# Patient Record
Sex: MALE | Race: WHITE | NOT HISPANIC OR LATINO | URBAN - METROPOLITAN AREA
[De-identification: names, ages, dates, MRNs, and addresses within clinical notes are randomized per-mention and may not be internally consistent; named-entity substitution may affect disease eponyms.]

---

## 2017-07-25 ENCOUNTER — INPATIENT (INPATIENT)
Facility: HOSPITAL | Age: 57
LOS: 2 days | Discharge: HOME | DRG: 440 | End: 2017-07-28
Attending: FAMILY MEDICINE | Admitting: HOSPITALIST
Payer: COMMERCIAL

## 2017-07-25 VITALS
HEART RATE: 79 BPM | OXYGEN SATURATION: 99 % | TEMPERATURE: 98 F | RESPIRATION RATE: 16 BRPM | HEIGHT: 70 IN | SYSTOLIC BLOOD PRESSURE: 150 MMHG | DIASTOLIC BLOOD PRESSURE: 82 MMHG | WEIGHT: 182.98 LBS

## 2017-07-25 DIAGNOSIS — K90.81 WHIPPLE'S DISEASE: Chronic | ICD-10-CM

## 2017-07-25 LAB
ALBUMIN SERPL ELPH-MCNC: 3.9 G/DL — SIGNIFICANT CHANGE UP (ref 3.3–5)
ALP SERPL-CCNC: 91 U/L — SIGNIFICANT CHANGE UP (ref 40–120)
ALT FLD-CCNC: 54 U/L — SIGNIFICANT CHANGE UP (ref 12–78)
AMYLASE P1 CFR SERPL: 168 U/L — HIGH (ref 25–115)
ANION GAP SERPL CALC-SCNC: 11 MMOL/L — SIGNIFICANT CHANGE UP (ref 5–17)
APTT BLD: 27 SEC — LOW (ref 27.5–37.4)
AST SERPL-CCNC: 50 U/L — HIGH (ref 15–37)
BASOPHILS # BLD AUTO: 0.1 K/UL — SIGNIFICANT CHANGE UP (ref 0–0.2)
BASOPHILS NFR BLD AUTO: 0.6 % — SIGNIFICANT CHANGE UP (ref 0–2)
BILIRUB SERPL-MCNC: 0.7 MG/DL — SIGNIFICANT CHANGE UP (ref 0.2–1.2)
BUN SERPL-MCNC: 11 MG/DL — SIGNIFICANT CHANGE UP (ref 7–23)
CALCIUM SERPL-MCNC: 8.8 MG/DL — SIGNIFICANT CHANGE UP (ref 8.5–10.1)
CHLORIDE SERPL-SCNC: 99 MMOL/L — SIGNIFICANT CHANGE UP (ref 96–108)
CO2 SERPL-SCNC: 27 MMOL/L — SIGNIFICANT CHANGE UP (ref 22–31)
CREAT SERPL-MCNC: 0.63 MG/DL — SIGNIFICANT CHANGE UP (ref 0.5–1.3)
EOSINOPHIL # BLD AUTO: 0 K/UL — SIGNIFICANT CHANGE UP (ref 0–0.5)
EOSINOPHIL NFR BLD AUTO: 0.1 % — SIGNIFICANT CHANGE UP (ref 0–6)
GLUCOSE SERPL-MCNC: 120 MG/DL — HIGH (ref 70–99)
HCT VFR BLD CALC: 42.6 % — SIGNIFICANT CHANGE UP (ref 39–50)
HGB BLD-MCNC: 15.8 G/DL — SIGNIFICANT CHANGE UP (ref 13–17)
INR BLD: 0.99 RATIO — SIGNIFICANT CHANGE UP (ref 0.88–1.16)
LIDOCAIN IGE QN: 2106 U/L — HIGH (ref 73–393)
LYMPHOCYTES # BLD AUTO: 1.1 K/UL — SIGNIFICANT CHANGE UP (ref 1–3.3)
LYMPHOCYTES # BLD AUTO: 8.9 % — LOW (ref 13–44)
MCHC RBC-ENTMCNC: 34.5 PG — HIGH (ref 27–34)
MCHC RBC-ENTMCNC: 37.1 GM/DL — HIGH (ref 32–36)
MCV RBC AUTO: 93.2 FL — SIGNIFICANT CHANGE UP (ref 80–100)
MONOCYTES # BLD AUTO: 0.9 K/UL — SIGNIFICANT CHANGE UP (ref 0–0.9)
MONOCYTES NFR BLD AUTO: 7.7 % — SIGNIFICANT CHANGE UP (ref 1–9)
NEUTROPHILS # BLD AUTO: 9.8 K/UL — HIGH (ref 1.8–7.4)
NEUTROPHILS NFR BLD AUTO: 82.7 % — HIGH (ref 43–77)
PLATELET # BLD AUTO: 166 K/UL — SIGNIFICANT CHANGE UP (ref 150–400)
POTASSIUM SERPL-MCNC: 3.3 MMOL/L — LOW (ref 3.5–5.3)
POTASSIUM SERPL-SCNC: 3.3 MMOL/L — LOW (ref 3.5–5.3)
PROT SERPL-MCNC: 8 G/DL — SIGNIFICANT CHANGE UP (ref 6–8.3)
PROTHROM AB SERPL-ACNC: 10.8 SEC — SIGNIFICANT CHANGE UP (ref 9.8–12.7)
RBC # BLD: 4.58 M/UL — SIGNIFICANT CHANGE UP (ref 4.2–5.8)
RBC # FLD: 10.6 % — SIGNIFICANT CHANGE UP (ref 10.3–14.5)
SODIUM SERPL-SCNC: 137 MMOL/L — SIGNIFICANT CHANGE UP (ref 135–145)
WBC # BLD: 11.9 K/UL — HIGH (ref 3.8–10.5)
WBC # FLD AUTO: 11.9 K/UL — HIGH (ref 3.8–10.5)

## 2017-07-25 RX ORDER — SODIUM CHLORIDE 9 MG/ML
3 INJECTION INTRAMUSCULAR; INTRAVENOUS; SUBCUTANEOUS ONCE
Qty: 0 | Refills: 0 | Status: COMPLETED | OUTPATIENT
Start: 2017-07-25 | End: 2017-07-25

## 2017-07-25 RX ORDER — MORPHINE SULFATE 50 MG/1
4 CAPSULE, EXTENDED RELEASE ORAL ONCE
Qty: 0 | Refills: 0 | Status: DISCONTINUED | OUTPATIENT
Start: 2017-07-25 | End: 2017-07-25

## 2017-07-25 RX ORDER — SODIUM CHLORIDE 9 MG/ML
1000 INJECTION INTRAMUSCULAR; INTRAVENOUS; SUBCUTANEOUS ONCE
Qty: 0 | Refills: 0 | Status: COMPLETED | OUTPATIENT
Start: 2017-07-25 | End: 2017-07-25

## 2017-07-25 RX ORDER — IOHEXOL 300 MG/ML
30 INJECTION, SOLUTION INTRAVENOUS ONCE
Qty: 0 | Refills: 0 | Status: COMPLETED | OUTPATIENT
Start: 2017-07-25 | End: 2017-07-26

## 2017-07-25 RX ORDER — ONDANSETRON 8 MG/1
4 TABLET, FILM COATED ORAL ONCE
Qty: 0 | Refills: 0 | Status: COMPLETED | OUTPATIENT
Start: 2017-07-25 | End: 2017-07-26

## 2017-07-25 RX ADMIN — SODIUM CHLORIDE 1000 MILLILITER(S): 9 INJECTION INTRAMUSCULAR; INTRAVENOUS; SUBCUTANEOUS at 23:00

## 2017-07-25 RX ADMIN — MORPHINE SULFATE 4 MILLIGRAM(S): 50 CAPSULE, EXTENDED RELEASE ORAL at 23:00

## 2017-07-25 RX ADMIN — SODIUM CHLORIDE 3 MILLILITER(S): 9 INJECTION INTRAMUSCULAR; INTRAVENOUS; SUBCUTANEOUS at 23:00

## 2017-07-25 NOTE — ED PROVIDER NOTE - PROGRESS NOTE DETAILS
Patient states he still has significant abdominal pain, started yesterday afternoon.  Informed of results of CT showing pancreatitis. Patient admits to heavy alcohol use, drinking 3-4 martinis daily and states he drank heavily prior to the onset of pain. Fluid, analgesia, NPO

## 2017-07-25 NOTE — ED PROVIDER NOTE - OBJECTIVE STATEMENT
pt with hx pancreatic ca s/p whipple c/o abd pain, nausea vomiting since this afternoon. no fevers, chills, ha, cp, sob, diarrhea, constipation, dysuria, hematuria.  pmd - [facility]  [facility]

## 2017-07-26 DIAGNOSIS — K85.90 ACUTE PANCREATITIS WITHOUT NECROSIS OR INFECTION, UNSPECIFIED: ICD-10-CM

## 2017-07-26 DIAGNOSIS — R10.84 GENERALIZED ABDOMINAL PAIN: ICD-10-CM

## 2017-07-26 DIAGNOSIS — Z29.9 ENCOUNTER FOR PROPHYLACTIC MEASURES, UNSPECIFIED: ICD-10-CM

## 2017-07-26 DIAGNOSIS — C25.9 MALIGNANT NEOPLASM OF PANCREAS, UNSPECIFIED: ICD-10-CM

## 2017-07-26 DIAGNOSIS — R11.0 NAUSEA: ICD-10-CM

## 2017-07-26 DIAGNOSIS — F10.99 ALCOHOL USE, UNSPECIFIED WITH UNSPECIFIED ALCOHOL-INDUCED DISORDER: ICD-10-CM

## 2017-07-26 DIAGNOSIS — E78.5 HYPERLIPIDEMIA, UNSPECIFIED: ICD-10-CM

## 2017-07-26 DIAGNOSIS — R11.2 NAUSEA WITH VOMITING, UNSPECIFIED: ICD-10-CM

## 2017-07-26 DIAGNOSIS — F17.200 NICOTINE DEPENDENCE, UNSPECIFIED, UNCOMPLICATED: ICD-10-CM

## 2017-07-26 DIAGNOSIS — I10 ESSENTIAL (PRIMARY) HYPERTENSION: ICD-10-CM

## 2017-07-26 PROBLEM — Z00.00 ENCOUNTER FOR PREVENTIVE HEALTH EXAMINATION: Status: ACTIVE | Noted: 2017-07-26

## 2017-07-26 LAB
ALBUMIN SERPL ELPH-MCNC: 3.3 G/DL — SIGNIFICANT CHANGE UP (ref 3.3–5)
ALP SERPL-CCNC: 80 U/L — SIGNIFICANT CHANGE UP (ref 40–120)
ALT FLD-CCNC: 43 U/L — SIGNIFICANT CHANGE UP (ref 12–78)
ANION GAP SERPL CALC-SCNC: 9 MMOL/L — SIGNIFICANT CHANGE UP (ref 5–17)
AST SERPL-CCNC: 32 U/L — SIGNIFICANT CHANGE UP (ref 15–37)
BASOPHILS # BLD AUTO: 0 K/UL — SIGNIFICANT CHANGE UP (ref 0–0.2)
BASOPHILS NFR BLD AUTO: 0.1 % — SIGNIFICANT CHANGE UP (ref 0–2)
BILIRUB SERPL-MCNC: 0.8 MG/DL — SIGNIFICANT CHANGE UP (ref 0.2–1.2)
BUN SERPL-MCNC: 7 MG/DL — SIGNIFICANT CHANGE UP (ref 7–23)
CALCIUM SERPL-MCNC: 8.3 MG/DL — LOW (ref 8.5–10.1)
CHLORIDE SERPL-SCNC: 97 MMOL/L — SIGNIFICANT CHANGE UP (ref 96–108)
CO2 SERPL-SCNC: 30 MMOL/L — SIGNIFICANT CHANGE UP (ref 22–31)
CREAT SERPL-MCNC: 0.62 MG/DL — SIGNIFICANT CHANGE UP (ref 0.5–1.3)
EOSINOPHIL # BLD AUTO: 0 K/UL — SIGNIFICANT CHANGE UP (ref 0–0.5)
EOSINOPHIL NFR BLD AUTO: 0 % — SIGNIFICANT CHANGE UP (ref 0–6)
GLUCOSE SERPL-MCNC: 125 MG/DL — HIGH (ref 70–99)
HCT VFR BLD CALC: 41.4 % — SIGNIFICANT CHANGE UP (ref 39–50)
HGB BLD-MCNC: 14.7 G/DL — SIGNIFICANT CHANGE UP (ref 13–17)
LIDOCAIN IGE QN: 612 U/L — HIGH (ref 73–393)
LYMPHOCYTES # BLD AUTO: 0.5 K/UL — LOW (ref 1–3.3)
LYMPHOCYTES # BLD AUTO: 4.4 % — LOW (ref 13–44)
MCHC RBC-ENTMCNC: 34.1 PG — HIGH (ref 27–34)
MCHC RBC-ENTMCNC: 35.5 GM/DL — SIGNIFICANT CHANGE UP (ref 32–36)
MCV RBC AUTO: 96 FL — SIGNIFICANT CHANGE UP (ref 80–100)
MONOCYTES # BLD AUTO: 0.5 K/UL — SIGNIFICANT CHANGE UP (ref 0–0.9)
MONOCYTES NFR BLD AUTO: 5.2 % — SIGNIFICANT CHANGE UP (ref 1–9)
NEUTROPHILS # BLD AUTO: 9.5 K/UL — HIGH (ref 1.8–7.4)
NEUTROPHILS NFR BLD AUTO: 90.3 % — HIGH (ref 43–77)
PLATELET # BLD AUTO: 136 K/UL — LOW (ref 150–400)
POTASSIUM SERPL-MCNC: 3.2 MMOL/L — LOW (ref 3.5–5.3)
POTASSIUM SERPL-SCNC: 3.2 MMOL/L — LOW (ref 3.5–5.3)
PROT SERPL-MCNC: 7 G/DL — SIGNIFICANT CHANGE UP (ref 6–8.3)
RBC # BLD: 4.31 M/UL — SIGNIFICANT CHANGE UP (ref 4.2–5.8)
RBC # FLD: 10.8 % — SIGNIFICANT CHANGE UP (ref 10.3–14.5)
SODIUM SERPL-SCNC: 136 MMOL/L — SIGNIFICANT CHANGE UP (ref 135–145)
WBC # BLD: 10.5 K/UL — SIGNIFICANT CHANGE UP (ref 3.8–10.5)
WBC # FLD AUTO: 10.5 K/UL — SIGNIFICANT CHANGE UP (ref 3.8–10.5)

## 2017-07-26 PROCEDURE — 93010 ELECTROCARDIOGRAM REPORT: CPT

## 2017-07-26 PROCEDURE — 99285 EMERGENCY DEPT VISIT HI MDM: CPT

## 2017-07-26 PROCEDURE — 74177 CT ABD & PELVIS W/CONTRAST: CPT | Mod: 26

## 2017-07-26 PROCEDURE — 12345: CPT | Mod: GC,NC

## 2017-07-26 PROCEDURE — 99223 1ST HOSP IP/OBS HIGH 75: CPT | Mod: AI,GC

## 2017-07-26 PROCEDURE — 99254 IP/OBS CNSLTJ NEW/EST MOD 60: CPT

## 2017-07-26 RX ORDER — HYDROMORPHONE HYDROCHLORIDE 2 MG/ML
0.5 INJECTION INTRAMUSCULAR; INTRAVENOUS; SUBCUTANEOUS EVERY 4 HOURS
Qty: 0 | Refills: 0 | Status: DISCONTINUED | OUTPATIENT
Start: 2017-07-26 | End: 2017-07-28

## 2017-07-26 RX ORDER — LOSARTAN POTASSIUM 100 MG/1
50 TABLET, FILM COATED ORAL DAILY
Qty: 0 | Refills: 0 | Status: DISCONTINUED | OUTPATIENT
Start: 2017-07-26 | End: 2017-07-28

## 2017-07-26 RX ORDER — PANTOPRAZOLE SODIUM 20 MG/1
40 TABLET, DELAYED RELEASE ORAL DAILY
Qty: 0 | Refills: 0 | Status: DISCONTINUED | OUTPATIENT
Start: 2017-07-26 | End: 2017-07-28

## 2017-07-26 RX ORDER — POTASSIUM CHLORIDE 20 MEQ
40 PACKET (EA) ORAL EVERY 4 HOURS
Qty: 0 | Refills: 0 | Status: COMPLETED | OUTPATIENT
Start: 2017-07-26 | End: 2017-07-26

## 2017-07-26 RX ORDER — SODIUM CHLORIDE 9 MG/ML
1000 INJECTION INTRAMUSCULAR; INTRAVENOUS; SUBCUTANEOUS ONCE
Qty: 0 | Refills: 0 | Status: COMPLETED | OUTPATIENT
Start: 2017-07-26 | End: 2017-07-26

## 2017-07-26 RX ORDER — LIPASE/PROTEASE/AMYLASE 16-48-48K
3 CAPSULE,DELAYED RELEASE (ENTERIC COATED) ORAL
Qty: 0 | Refills: 0 | Status: DISCONTINUED | OUTPATIENT
Start: 2017-07-26 | End: 2017-07-28

## 2017-07-26 RX ORDER — ZOLPIDEM TARTRATE 10 MG/1
5 TABLET ORAL AT BEDTIME
Qty: 0 | Refills: 0 | Status: DISCONTINUED | OUTPATIENT
Start: 2017-07-26 | End: 2017-07-28

## 2017-07-26 RX ORDER — SIMVASTATIN 20 MG/1
20 TABLET, FILM COATED ORAL AT BEDTIME
Qty: 0 | Refills: 0 | Status: DISCONTINUED | OUTPATIENT
Start: 2017-07-26 | End: 2017-07-28

## 2017-07-26 RX ORDER — THIAMINE MONONITRATE (VIT B1) 100 MG
100 TABLET ORAL DAILY
Qty: 0 | Refills: 0 | Status: COMPLETED | OUTPATIENT
Start: 2017-07-26 | End: 2017-07-28

## 2017-07-26 RX ORDER — HYDROMORPHONE HYDROCHLORIDE 2 MG/ML
0.5 INJECTION INTRAMUSCULAR; INTRAVENOUS; SUBCUTANEOUS ONCE
Qty: 0 | Refills: 0 | Status: DISCONTINUED | OUTPATIENT
Start: 2017-07-26 | End: 2017-07-26

## 2017-07-26 RX ORDER — MORPHINE SULFATE 50 MG/1
4 CAPSULE, EXTENDED RELEASE ORAL ONCE
Qty: 0 | Refills: 0 | Status: DISCONTINUED | OUTPATIENT
Start: 2017-07-26 | End: 2017-07-26

## 2017-07-26 RX ORDER — MORPHINE SULFATE 50 MG/1
2 CAPSULE, EXTENDED RELEASE ORAL EVERY 4 HOURS
Qty: 0 | Refills: 0 | Status: DISCONTINUED | OUTPATIENT
Start: 2017-07-26 | End: 2017-07-28

## 2017-07-26 RX ORDER — ENOXAPARIN SODIUM 100 MG/ML
40 INJECTION SUBCUTANEOUS EVERY 24 HOURS
Qty: 0 | Refills: 0 | Status: DISCONTINUED | OUTPATIENT
Start: 2017-07-26 | End: 2017-07-28

## 2017-07-26 RX ORDER — SODIUM CHLORIDE 9 MG/ML
1000 INJECTION INTRAMUSCULAR; INTRAVENOUS; SUBCUTANEOUS
Qty: 0 | Refills: 0 | Status: DISCONTINUED | OUTPATIENT
Start: 2017-07-26 | End: 2017-07-26

## 2017-07-26 RX ORDER — LOSARTAN POTASSIUM 100 MG/1
50 TABLET, FILM COATED ORAL DAILY
Qty: 0 | Refills: 0 | Status: DISCONTINUED | OUTPATIENT
Start: 2017-07-26 | End: 2017-07-26

## 2017-07-26 RX ORDER — HYDROMORPHONE HYDROCHLORIDE 2 MG/ML
1 INJECTION INTRAMUSCULAR; INTRAVENOUS; SUBCUTANEOUS ONCE
Qty: 0 | Refills: 0 | Status: DISCONTINUED | OUTPATIENT
Start: 2017-07-26 | End: 2017-07-26

## 2017-07-26 RX ORDER — ONDANSETRON 8 MG/1
4 TABLET, FILM COATED ORAL EVERY 6 HOURS
Qty: 0 | Refills: 0 | Status: DISCONTINUED | OUTPATIENT
Start: 2017-07-26 | End: 2017-07-28

## 2017-07-26 RX ORDER — SIMVASTATIN 20 MG/1
0 TABLET, FILM COATED ORAL
Qty: 0 | Refills: 0 | COMMUNITY

## 2017-07-26 RX ORDER — ASPIRIN/CALCIUM CARB/MAGNESIUM 324 MG
81 TABLET ORAL DAILY
Qty: 0 | Refills: 0 | Status: DISCONTINUED | OUTPATIENT
Start: 2017-07-26 | End: 2017-07-28

## 2017-07-26 RX ORDER — SODIUM CHLORIDE 9 MG/ML
1000 INJECTION, SOLUTION INTRAVENOUS
Qty: 0 | Refills: 0 | Status: DISCONTINUED | OUTPATIENT
Start: 2017-07-26 | End: 2017-07-28

## 2017-07-26 RX ADMIN — Medication 81 MILLIGRAM(S): at 11:25

## 2017-07-26 RX ADMIN — SIMVASTATIN 20 MILLIGRAM(S): 20 TABLET, FILM COATED ORAL at 21:45

## 2017-07-26 RX ADMIN — ONDANSETRON 4 MILLIGRAM(S): 8 TABLET, FILM COATED ORAL at 01:13

## 2017-07-26 RX ADMIN — HYDROMORPHONE HYDROCHLORIDE 0.5 MILLIGRAM(S): 2 INJECTION INTRAMUSCULAR; INTRAVENOUS; SUBCUTANEOUS at 01:51

## 2017-07-26 RX ADMIN — SODIUM CHLORIDE 1000 MILLILITER(S): 9 INJECTION INTRAMUSCULAR; INTRAVENOUS; SUBCUTANEOUS at 04:59

## 2017-07-26 RX ADMIN — HYDROMORPHONE HYDROCHLORIDE 0.5 MILLIGRAM(S): 2 INJECTION INTRAMUSCULAR; INTRAVENOUS; SUBCUTANEOUS at 06:17

## 2017-07-26 RX ADMIN — ENOXAPARIN SODIUM 40 MILLIGRAM(S): 100 INJECTION SUBCUTANEOUS at 11:25

## 2017-07-26 RX ADMIN — MORPHINE SULFATE 4 MILLIGRAM(S): 50 CAPSULE, EXTENDED RELEASE ORAL at 00:31

## 2017-07-26 RX ADMIN — PANTOPRAZOLE SODIUM 40 MILLIGRAM(S): 20 TABLET, DELAYED RELEASE ORAL at 11:25

## 2017-07-26 RX ADMIN — ZOLPIDEM TARTRATE 5 MILLIGRAM(S): 10 TABLET ORAL at 22:21

## 2017-07-26 RX ADMIN — Medication 3 CAPSULE(S): at 17:43

## 2017-07-26 RX ADMIN — Medication 40 MILLIEQUIVALENT(S): at 17:27

## 2017-07-26 RX ADMIN — HYDROMORPHONE HYDROCHLORIDE 0.5 MILLIGRAM(S): 2 INJECTION INTRAMUSCULAR; INTRAVENOUS; SUBCUTANEOUS at 03:20

## 2017-07-26 RX ADMIN — Medication 40 MILLIEQUIVALENT(S): at 13:05

## 2017-07-26 RX ADMIN — LOSARTAN POTASSIUM 50 MILLIGRAM(S): 100 TABLET, FILM COATED ORAL at 11:25

## 2017-07-26 RX ADMIN — SODIUM CHLORIDE 100 MILLILITER(S): 9 INJECTION, SOLUTION INTRAVENOUS at 05:31

## 2017-07-26 RX ADMIN — Medication 100 MILLIGRAM(S): at 11:25

## 2017-07-26 RX ADMIN — HYDROMORPHONE HYDROCHLORIDE 1 MILLIGRAM(S): 2 INJECTION INTRAMUSCULAR; INTRAVENOUS; SUBCUTANEOUS at 04:52

## 2017-07-26 RX ADMIN — IOHEXOL 30 MILLILITER(S): 300 INJECTION, SOLUTION INTRAVENOUS at 01:13

## 2017-07-26 RX ADMIN — SODIUM CHLORIDE 125 MILLILITER(S): 9 INJECTION INTRAMUSCULAR; INTRAVENOUS; SUBCUTANEOUS at 05:36

## 2017-07-26 NOTE — PROGRESS NOTE ADULT - ASSESSMENT
Patient is a 57 y/o male with PMH HTN, HLD, who presents to the ED with abdominal pain. Admitted to [facility] for acute pancreatitis, nausea/vomiting, Alcohol use disorder, tobacco use disorder.

## 2017-07-26 NOTE — PROGRESS NOTE ADULT - PROBLEM SELECTOR PLAN 2
Pt admits to daily alcohol use (3 martinis a day)  FU addiction med consult Pt admits to daily alcohol use (3 martinis a day)  FU addiction med consult  pt not interested in rehab at this time nor quitting

## 2017-07-26 NOTE — H&P ADULT - NSHPPHYSICALEXAM_GEN_ALL_CORE
Physical Exam:  General: Well developed, well nourished, NAD  HEENT: NCAT, PERRLA, EOMI bl, moist mucous membranes   Neck: Supple, nontender, no mass  Neurology: A&Ox3, nonfocal, CN II-XII grossly intact, sensation intact, no gait abnormalities   Respiratory: CTA B/L, No W/R/R  CV: RRR, +S1/S2, no murmurs, rubs or gallops  Abdominal: Soft, Pain to light palpation in lower abdominal quadrants, +BSx4  Extremities: No C/C/E, + peripheral pulses  MSK: Normal ROM, no joint erythema or warmth, no joint swelling   Skin: warm, dry, normal color, no rash or abnormal lesions

## 2017-07-26 NOTE — CONSULT NOTE ADULT - SUBJECTIVE AND OBJECTIVE BOX
Chief Complaint:  Patient is a 56y old  Male who presents with a chief complaint of abdominal pain (26 Jul 2017 05:02)      HPI: Patient is a 55 y/o male with PMH HTN, HLD, who presents to the ED with abdominal pain. The pain suddenly began yesterday afternoon and progressively worsened. The pain is a constant pain, located in epigastrium, radiating to the back in band like manner associated with nausea. The pain is rated 9 out of 10, not relieved by Morphine. The patient has no prior history of a similar pain. He admits to decreased appetite and vomiting. He denies fever, chills, shortness of breath, chest pain, palpitations, nausea, constipation/diarrhea, dysphagia, weight loss, blood in stools. (+) h/o pancreatic cancer s/p Whipple's 2 years ago. (+) daily etoh use.    Allergies:  No Known Allergies      Medications:  enoxaparin Injectable 40 milliGRAM(s) SubCutaneous every 24 hours  ondansetron Injectable 4 milliGRAM(s) IV Push every 6 hours PRN  sodium chloride 0.9% 1000 milliLiter(s) IV Continuous <Continuous>  LORazepam   Injectable 2 milliGRAM(s) IV Push every 1 hour PRN  thiamine 100 milliGRAM(s) Oral daily  morphine  - Injectable 2 milliGRAM(s) IV Push every 4 hours PRN  HYDROmorphone  Injectable 0.5 milliGRAM(s) IV Push every 4 hours PRN  aspirin enteric coated 81 milliGRAM(s) Oral daily  simvastatin 20 milliGRAM(s) Oral at bedtime  pantoprazole   Suspension 40 milliGRAM(s) Oral daily  losartan 50 milliGRAM(s) Oral daily      PMHX/PSHX:  HTN (hypertension)  Pancreatic cancer  Hyperlipidemia  Whipple disease  No significant past surgical history      Family history:  No pertinent family history in first degree relatives      Social History: + daily etoh use, denies tobacco/illicit drug use    ROS:     General:  No wt loss, fevers, chills, night sweats, fatigue,   Eyes:  Good vision, no reported pain  ENT:  No sore throat, pain, runny nose, dysphagia  CV:  No pain, palpitations, hypo/hypertension  Resp:  No dyspnea, cough, tachypnea, wheezing  GI:  abd pain, nausea  :  No pain, bleeding, incontinence, nocturia  Muscle:  No pain, weakness  Neuro:  No weakness, tingling, memory problems  Psych:  No fatigue, insomnia, mood problems, depression  Endocrine:  No polyuria, polydipsia, cold/heat intolerance  Heme:  No petechiae, ecchymosis, easy bruisability  Skin:  No rash, tattoos, scars, edema      PHYSICAL EXAM:   Vital Signs:  Vital Signs Last 24 Hrs  T(C): 37 (26 Jul 2017 07:56), Max: 37 (26 Jul 2017 07:56)  T(F): 98.6 (26 Jul 2017 07:56), Max: 98.6 (26 Jul 2017 07:56)  HR: 85 (26 Jul 2017 07:56) (79 - 119)  BP: 161/88 (26 Jul 2017 07:56) (150/82 - 178/107)  BP(mean): --  RR: 18 (26 Jul 2017 07:56) (16 - 18)  SpO2: 98% (26 Jul 2017 07:56) (93% - 99%)  Daily Height in cm: 177.8 (25 Jul 2017 22:19)    Daily     GENERAL:  Appears stated age, well-groomed, well-nourished, no distress  HEENT:  NC/AT,  conjunctivae clear and pink, no thyromegaly, nodules, adenopathy, no JVD, sclera -anicteric  CHEST:  Full & symmetric excursion, no increased effort, breath sounds clear  HEART:  Regular rhythm, S1, S2, no murmur/rub/S3/S4, no abdominal bruit, no edema  ABDOMEN:  Soft, mild epigastric tenderness, non-distended, normoactive bowel sounds,  no masses ,no hepato-splenomegaly, no signs of chronic liver disease  EXTEREMITIES:  no cyanosis,clubbing or edema  SKIN:  No rash/erythema/ecchymoses/petechiae/wounds/abscess/warm/dry  NEURO:  Alert, oriented, no asterixis, no tremor, no encephalopathy    LABS:                        14.7   10.5  )-----------( 136      ( 26 Jul 2017 08:31 )             41.4     07-26    136  |  97  |  7   ----------------------------<  125<H>  3.2<L>   |  30  |  0.62    Ca    8.3<L>      26 Jul 2017 08:31  Phos  2.6     07-25  Mg     1.8     07-25    TPro  7.0  /  Alb  3.3  /  TBili  0.8  /  DBili  x   /  AST  32  /  ALT  43  /  AlkPhos  80  07-26    LIVER FUNCTIONS - ( 26 Jul 2017 08:31 )  Alb: 3.3 g/dL / Pro: 7.0 g/dL / ALK PHOS: 80 U/L / ALT: 43 U/L / AST: 32 U/L / GGT: x           PT/INR - ( 25 Jul 2017 23:27 )   PT: 10.8 sec;   INR: 0.99 ratio         PTT - ( 25 Jul 2017 23:27 )  PTT:27.0 sec    Amylase Serum--      Lipase [id number]       Ammonia--  Amylase [id number]      Lipase [id number]       Ammonia--      Imaging:

## 2017-07-26 NOTE — H&P ADULT - ATTENDING COMMENTS
Patient is a 57 y/o male with PMH HTN, HLD,  Admitted to [facility] for acute pancreatitis, nausea/vomiting, Alcohol use disorder, tobacco use disorder.  GI consulted for the pancreatitis.  NPO, IVF.  Pain management.  Pt will like go through withdrawal since the pancreatitis may have been likely 2/2 acute etoh abuse.  Dr. Glynn consulted.

## 2017-07-26 NOTE — H&P ADULT - PROBLEM SELECTOR PLAN 3
Patient drinks 3 martinis daily, H/O pancreatic cancer and acute pancreatitis   Thiamine infusion given @ 100ml/hr, Folic acid and multivitamin given  f/u Addiction Medicine consult (Dr. Glynn)

## 2017-07-26 NOTE — PROGRESS NOTE ADULT - SUBJECTIVE AND OBJECTIVE BOX
Patient is a 56y old  Male who presents with a chief complaint of abdominal pain (26 Jul 2017 05:02)      INTERVAL HPI/OVERNIGHT EVENTS: Pt S+E at bedside. Resting comfortably, no overnight events. Pt states the pain meds are helping his abd pain. Denies N/V overnight. Pt admits to having a few drinks yesterday afternoon prior to abd pain starting. Pt states he has 3 martinis daily and smokes 1.5-2 packs of cigarettes a day. No other complaints at this time.   CIWA score = 1 at 0808 this morning.     MEDICATIONS  (STANDING):  enoxaparin Injectable 40 milliGRAM(s) SubCutaneous every 24 hours  sodium chloride 0.9% 1000 milliLiter(s) (100 mL/Hr) IV Continuous <Continuous>  thiamine 100 milliGRAM(s) Oral daily  aspirin enteric coated 81 milliGRAM(s) Oral daily  simvastatin 20 milliGRAM(s) Oral at bedtime  pantoprazole   Suspension 40 milliGRAM(s) Oral daily  losartan 50 milliGRAM(s) Oral daily    MEDICATIONS  (PRN):  ondansetron Injectable 4 milliGRAM(s) IV Push every 6 hours PRN Nausea  LORazepam   Injectable 2 milliGRAM(s) IV Push every 1 hour PRN CIWA-Ar score 8 or greater  morphine  - Injectable 2 milliGRAM(s) IV Push every 4 hours PRN Moderate Pain (4 - 6)  HYDROmorphone  Injectable 0.5 milliGRAM(s) IV Push every 4 hours PRN Severe Pain (7 - 10)      Allergies    No Known Allergies    Intolerances        REVIEW OF SYSTEMS:  CONSTITUTIONAL: No fever or chills  HEENT:  No headache, no sore throat  RESPIRATORY: No cough, wheezing, or shortness of breath  CARDIOVASCULAR: No chest pain, palpitations, or leg swelling  GASTROINTESTINAL: No nausea, vomiting, or diarrhea. + abd pain  GENITOURINARY: No dysuria, frequency, or hematuria  NEUROLOGICAL: no focal weakness or dizziness  SKIN:  No rashes or lesions   MUSCULOSKELETAL: no myalgias   PSYCHIATRIC: No depression or anxiety  ROS Unable to obtain due to - [ ] Dementia  [ ] Lethargy  REST OF REVIEW Of SYSTEM - [x] Normal     Vital Signs Last 24 Hrs  T(C): 37 (26 Jul 2017 07:56), Max: 37 (26 Jul 2017 07:56)  T(F): 98.6 (26 Jul 2017 07:56), Max: 98.6 (26 Jul 2017 07:56)  HR: 85 (26 Jul 2017 07:56) (79 - 119)  BP: 161/88 (26 Jul 2017 07:56) (150/82 - 178/107)  BP(mean): --  RR: 18 (26 Jul 2017 07:56) (16 - 18)  SpO2: 98% (26 Jul 2017 07:56) (93% - 99%)    PHYSICAL EXAM:  GENERAL: No Acute Distress, mildly anxious  HEENT:  EOMI, mucous membranes moist  CHEST/LUNG:  Clear To Auscultation b/l, no rales, wheezes, or rhonchi  HEART:  Regular Rate Rhythm, S1, S2, [-]murmur  ABDOMEN:  Bowel Sounds+, soft, Non-Tender, Mild distention  EXTREMITIES: no edema or calf tenderness  SKIN:  no rash  NERVOUS SYSTEM: AA&Ox3    DIET:     LABS:                        14.7   10.5  )-----------( 136      ( 26 Jul 2017 08:31 )             41.4     CBC Full  -  ( 26 Jul 2017 08:31 )  WBC Count : 10.5 K/uL  Hemoglobin : 14.7 g/dL  Hematocrit : 41.4 %  Platelet Count - Automated : 136 K/uL  Mean Cell Volume : 96.0 fl  Mean Cell Hemoglobin : 34.1 pg  Mean Cell Hemoglobin Concentration : 35.5 gm/dL  Auto Neutrophil # : 9.5 K/uL  Auto Lymphocyte # : 0.5 K/uL  Auto Monocyte # : 0.5 K/uL  Auto Eosinophil # : 0.0 K/uL  Auto Basophil # : 0.0 K/uL  Auto Neutrophil % : 90.3 %  Auto Lymphocyte % : 4.4 %  Auto Monocyte % : 5.2 %  Auto Eosinophil % : 0.0 %  Auto Basophil % : 0.1 %    26 Jul 2017 08:31    136    |  97     |  7      ----------------------------<  125    3.2     |  30     |  0.62     Ca    8.3        26 Jul 2017 08:31  Phos  2.6       25 Jul 2017 23:27  Mg     1.8       25 Jul 2017 23:27    TPro  7.0    /  Alb  3.3    /  TBili  0.8    /  DBili  x      /  AST  32     /  ALT  43     /  AlkPhos  80     26 Jul 2017 08:31    PT/INR - ( 25 Jul 2017 23:27 )   PT: 10.8 sec;   INR: 0.99 ratio         PTT - ( 25 Jul 2017 23:27 )  PTT:27.0 sec    CAPILLARY BLOOD GLUCOSE        RADIOLOGY & ADDITIONAL TESTS:      EXAM:  CT ABDOMEN AND PELVIS OC IC                            PROCEDURE DATE:  07/26/2017          INTERPRETATION:  CLINICAL INFORMATION: Lower abdominal and back pain.   Pancreatic cancer status post Whipple procedure.    TECHNIQUE: Contrast enhanced CT of the abdomen and pelvis was performed   with coronal and sagittal reformats. 95 cc Omnipaque 350 were   administered intravenously and 5 cc were discarded. Oral contrast was   administered.     COMPARISON: None available.    FINDINGS:    LOWER CHEST: Within normal limits.    HEPATOBILIARY: Fatty liver and hepatomegaly. Whipple procedure with   cholecystectomy and choledochojejunostomy. No biliary ductal dilation.  PANCREAS: Whipple procedure with pancreaticojejunostomy. Prominent main   pancreatic duct measuring 4 mm with peripancreatic inflammation and trace   peripancreatic fluid suggesting pancreatitis. No peripancreatic fluid   collection. Homogeneous parenchymal enhancement.  SPLEEN: Within normal limits.  ADRENALS: Indeterminate right adrenal nodule measuring 1.3 x 1.8 x 1.5   cm. Left adrenal gland within normal limits.    KIDNEYS/URETERS/BLADDER: Within normal limits.  REPRODUCTIVE ORGANS: Within normal limits.    BOWEL/PERITONEUM: Whipple procedure with duodenectomy and   gastrojejunostomy. Portions of the gastrointestinal tract are collapsed,   limiting evaluation. No bowel obstruction, inflammation or   pneumoperitoneum.  Normal appendix within a small fat-containing right   inguinal hernia. Colonic diverticulosis without diverticulitis.    VESSELS:  Extensive atherosclerotic calcifications. No abdominal aortic   aneurysm.  LYMPHATICS/RETROPERITONEUM: Nonspecific cluster of aortocaval lymph nodes   measuring up to 1 cm in short axis. Scattered clusters of subcentimeter   nonspecific mesenteric lymph nodes. No retroperitoneal hematoma.      SOFT TISSUES: Small fat-containing bilateral inguinal hernias.  BONES: Within normal limits.    IMPRESSION:   Whipple procedure. Peripancreatic inflammation with trace free fluid,   suggesting acute pancreatitis. Confirm with clinical examination and   serum lipase levels.     Fatty liver and hepatomegaly.    Nonspecific subcentimeter mesenteric lymph nodes and aortocaval lymph   nodes measuring up to 1 cm.          KIMBERLY PEREZ M.D., ATTENDING RADIOLOGIST  This document has been electronically signed. Jul 26 2017  4:20AM          Personally reviewed.     Consultant(s) Notes Reviewed:  [x] YES  [ ] NO    Care Discussed with [ ] Consultants  [x] Patient  [ ] Family  [x]      [ ] Other; RN  DVT ppx  Advanced directive

## 2017-07-26 NOTE — H&P ADULT - NSHPSOCIALHISTORY_GEN_ALL_CORE
Social History/Preventive Medicine:    Marital Status:   Occupation: Contractor   Lives with: Wife  Ambulates at home: without restriction     Substance Use:  Tobacco Usage: 1.5-2 ppd for 45 yrs   Alcohol Usage: 3 martinis daily; last drink around 1:00 today    Illicit Drug Usage: Denies

## 2017-07-26 NOTE — H&P ADULT - NSHPREVIEWOFSYSTEMS_GEN_ALL_CORE
Constitutional: Admits decreased appetite; Denies fever, chills, diaphoresis   HEENT: denies blurry vision, difficulty hearing  Respiratory: denies SOB, ARRIAGA, cough, sputum production, wheezing, hemoptysis  Cardiovascular: denies CP, palpitations, edema  Gastrointestinal: Admits vomiting, abdominal pain. Denies nausea, diarrhea, constipation, melena, hematochezia   Genitourinary: denies dysuria, frequency, urgency, hematuria   Skin/Breast: denies rash, itching  Musculoskeletal: denies myalgias, joint swelling, muscle weakness  Neurologic: denies headache, weakness, dizziness, paresthesias, numbness/tingling  Psychiatric: denies feeling anxious, depressed, suicidal, homicidal thoughts  Hematology/Oncology: denies bruising, tender or enlarged lymph nodes   ROS negative except as noted above

## 2017-07-26 NOTE — PROGRESS NOTE ADULT - PROBLEM SELECTOR PLAN 1
Pt s/p Whipple procedure for pancreatic cancer 2 years ago  Admits to drinking 3 martinis daily  CT Abd/Pelv showed acute pancreatitis  Repeat lipase this , down from 2106  Keep Pt NPO  Pain control with morphine and dilaudid  Cont zofran for nausea  GI (Elijah) agrees with above plan Pt s/p Whipple procedure for pancreatic cancer 2 years ago  Admits to drinking 3 martinis daily  CT Abd/Pelv showed acute pancreatitis  Repeat lipase this , down from 2106  Will advance diet to Clear liquids per GI (Elijah)  Pain control with morphine and dilaudid  Cont zofran for nausea  GI (Elijah) agrees with above plan

## 2017-07-26 NOTE — CONSULT NOTE ADULT - PROBLEM SELECTOR RECOMMENDATION 9
mild acute pancreatitis  likely secondary etoh   aggressive IVF hydration  advance diet to clear liquid diet, pain management

## 2017-07-26 NOTE — H&P ADULT - ASSESSMENT
Patient is a 55 y/o male with PMH HTN, HLD, who presents to the ED with abdominal pain. Admit to [facility] for acute pancreatitis, nausea/vomiting, Alcohol use disorder, tobacco use disorder.

## 2017-07-26 NOTE — PROGRESS NOTE ADULT - ATTENDING COMMENTS
57 y/o male with PMH HTN, HLD, pancreatic CA with whipple procedure,  who presents to the ED with abdominal pain. Admitted to [facility] for acute pancreatitis, nausea/vomiting, Alcohol use disorder, tobacco use disorder. Plan: advance diet as per gi, once improved dc to home with close follow up with pmd, pain control, ivf, monitor ciwa scale as per protocol, monitor clinical course, trend lipase 55 y/o male with PMH HTN, HLD, pancreatic CA with whipple procedure,  who presents to the ED with abdominal pain. Admitted to [facility] for acute pancreatitis, nausea/vomiting, Alcohol use disorder, tobacco use disorder. Plan: advance diet as per gi, once improved dc to home with close follow up with pmd, pain control, ivf, monitor ciwa scale as per protocol, monitor clinical course, trend lipase, apprec addiction meds recs, poss dc tomorrow if tolerating po

## 2017-07-26 NOTE — H&P ADULT - PROBLEM SELECTOR PLAN 4
h/o Whipple procedure Continue home medications: Zocor 20 mg at bedtime Patient counseled on tobacco cessation and medication for cessation. Patient refused use of nicotine patch. Patient counseled on tobacco cessation and medication for cessation. Patient refused use of nicotine patch.  Wife states he takes Wellbutrin, but not filled at [facility] pharmacy. F/u with wife primary if on Wellbutrin or if this is filled at another pharmacy.

## 2017-07-26 NOTE — H&P ADULT - HISTORY OF PRESENT ILLNESS
Patient is a 55 y/o male with PMH HTN, HLD, who presents to the ED with abdominal pain. The pain suddenly began today at 2:00 PM and progressively worsened. The pain is a constant pain, located in right and left lower abdominal quadrants. The pain is rated 9 out of 10, not relieved by Morphine. The patient has no prior history of a similar pain. He admits to decreased appetite and vomiting. He denies fever, chills, shortness of breath, chest pain, palpitations, nausea, constipation, diarrhea or weakness.     In the ED vitals were: T 97.7 HR 79, /82, RR 16, SpO2 99% on room air. Abnormal labs include: Lipase 2106, Amylase 168, WBC 11.9, K 3.3, Glucose 120   CT Ab/pelvis w/ PO & IV contrast - peripancreatic inflammation w/ trace free fluid, suggests pancreatitis; h/o whipple procedure   Fluids: 2 L NSS IV Bolus   UCX collected. Patient is a 57 y/o male with PMH HTN, HLD, who presents to the ED with abdominal pain. The pain suddenly began today at 2:00 PM and progressively worsened. The pain is a constant pain, located in right and left lower abdominal quadrants. The pain is rated 9 out of 10, not relieved by Morphine. The patient has no prior history of a similar pain. He admits to decreased appetite and vomiting. He denies fever, chills, shortness of breath, chest pain, palpitations, nausea, constipation, diarrhea or weakness.     In the ED vitals were: T 97.7 HR 79, /82, RR 16, SpO2 99% on room air. Abnormal labs include: Lipase 2106, Amylase 168, WBC 11.9, K 3.3, Glucose 120   CT Ab/pelvis w/ PO & IV contrast - peripancreatic inflammation w/ trace free fluid, suggests pancreatitis; h/o whipple procedure   Fluids: 2 L NSS IV Bolus, 1 L NSS with thiamine @ 100 mL/hr , folic acid, multivitamin additive   UCX collected. Patient is a 57 y/o male with PMH HTN, HLD, who presents to the ED with abdominal pain. The pain suddenly began today at 2:00 PM and progressively worsened. The pain is a constant pain, located in right and left lower abdominal quadrants. The pain is rated 9 out of 10, not relieved by Morphine. The patient has no prior history of a similar pain. He admits to decreased appetite and vomiting. He denies fever, chills, shortness of breath, chest pain, palpitations, nausea, constipation, diarrhea or weakness.     In the ED vitals were: T 97.7 HR 79, /82, RR 16, SpO2 99% on room air. Abnormal labs include: Lipase 2106, Amylase 168, WBC 11.9, K 3.3, Glucose 120. CT Ab/pelvis w/ PO & IV contrast - peripancreatic inflammation w/ trace free fluid, suggests pancreatitis; h/o whipple procedure. Fluids given include: 2 L NSS IV Bolus, 1 L NSS with thiamine @ 100 mL/hr , folic acid, multivitamin additive. UCX collected. Patient is a 55 y/o male with PMH HTN, HLD, who presents to the ED with abdominal pain. The pain suddenly began today at 2:00 PM and progressively worsened. The pain is a constant pain, located in right and left lower abdominal quadrants. The pain is rated 9 out of 10, not relieved by Morphine. The patient has no prior history of a similar pain. He admits to decreased appetite and vomiting. He denies fever, chills, shortness of breath, chest pain, palpitations, nausea, constipation, diarrhea or weakness.     In the ED vitals were: T 97.7 HR 79, /82, RR 16, SpO2 99% on room air. Abnormal labs include: Lipase 2106, Amylase 168, WBC 11.9, K 3.3, Glucose 120. CT Ab/pelvis w/ PO & IV contrast - peripancreatic inflammation w/ trace free fluid, suggests pancreatitis; h/o whipple procedure. Fluids given include: 2 L NSS IV Bolus, 1 L NSS with thiamine @ 100 mL/hr , folic acid, multivitamin additive. UCX collected.     Pt does admit to drinking Etoh prior to getting the abdominal pain.  He drinks 3 martini glasses a day.  Per ER provider, patient had drunk more than usual prior to painful event.  Patient reports having only one drink.

## 2017-07-26 NOTE — H&P ADULT - PROBLEM SELECTOR PLAN 1
Acute pancreatitis most likely secondary to alcohol use disorder   Admit to F  Diet NPO, except home medications  Pain control: IV Dilaudid 1 mg IV   Continue IV fluids: NSS @75 mL/hr with thiamine @ 100 mL/hr, folic acid and multivitamin infusion  Continue IV Zofran for nausea/vomiting   GI consult (Dr. Cleveland) Acute pancreatitis most likely secondary to alcohol use disorder   Admit to F  Diet NPO, except home medications  Pain control: Moderate - Morphine 2 mg IV / Severe - IV Dilaudid 0.5 mg   Continue IV fluids: Complete NSS with thiamine @ 100 mL/hr, folic acid and multivitamin infusion, resume NSS @ 100 mL/hr   Continue IV Zofran for nausea/vomiting   GI consult (Dr. Cleveland) Acute pancreatitis most likely secondary to alcohol use disorder   Admit to F  Diet NPO, except home medications  Pain control: Moderate - Morphine 2 mg IV / Severe - IV Dilaudid 0.5 mg   Continue IV fluids: Complete NSS with thiamine @ 100 mL/hr, folic acid and multivitamin infusion, resume NSS @ 100 mL/hr   Continue IV Zofran for nausea/vomiting   GI consult (Dr. Cleveland)  f/u Lipase (7:00 AM)

## 2017-07-26 NOTE — CONSULT NOTE ADULT - SUBJECTIVE AND OBJECTIVE BOX
HPI:  Patient is a 55 y/o male with PMH HTN, HLD, who presents to the ED with abdominal pain. The pain suddenly began today at 2:00 PM and progressively worsened. The pain is a constant pain, located in right and left lower abdominal quadrants. The pain is rated 9 out of 10, not relieved by Morphine. The patient has no prior history of a similar pain. He admits to decreased appetite and vomiting. He denies fever, chills, shortness of breath, chest pain, palpitations, nausea, constipation, diarrhea or weakness.     In the ED vitals were: T 97.7 HR 79, /82, RR 16, SpO2 99% on room air. Abnormal labs include: Lipase 2106, Amylase 168, WBC 11.9, K 3.3, Glucose 120. CT Ab/pelvis w/ PO & IV contrast - peripancreatic inflammation w/ trace free fluid, suggests pancreatitis; h/o whipple procedure. Fluids given include: 2 L NSS IV Bolus, 1 L NSS with thiamine @ 100 mL/hr , folic acid, multivitamin additive. UCX collected.     Pt does admit to drinking Etoh prior to getting the abdominal pain.  He drinks 3 martini glasses a day.  Per ER provider, patient had drunk more than usual prior to painful event.  Patient reports having only one drink. (26 Jul 2017 05:02)      Detox:  Chat reviewed Drinking three Martini per day  no tremors   No Xanax or Valium  On Ativan as needed       PAST MEDICAL & SURGICAL HISTORY:  HTN (hypertension)  Pancreatic cancer  Hyperlipidemia  Whipple disease      Allergies    No Known Allergies    Intolerances        FAMILY HISTORY:  No pertinent family history in first degree relatives      SOCIAL HISTORY:    REVIEW OF SYSTEMS:    Constitutional: No fever, weight loss or fatigue  ENT:  No difficulty hearing, tinnitus, vertigo; No sinus or throat pain  Neck: No pain or stiffness  Respiratory: No cough, wheezing, chills or hemoptysis  Cardiovascular: No chest pain, palpitations, shortness of breath, dizziness or leg swelling  Gastrointestinal: No abdominal or epigastric pain. No nausea, vomiting or hematemesis; No diarrhea or constipation. No melena or hematochezia.  Neurological: No headaches, memory loss, loss of strength, numbness or tremors  Musculoskeletal: No joint pain or swelling; No muscle, back or extremity pain  Psychiatric: No depression, anxiety, mood swings or difficulty sleeping    MEDICATIONS  (STANDING):  enoxaparin Injectable 40 milliGRAM(s) SubCutaneous every 24 hours  sodium chloride 0.9% 1000 milliLiter(s) (100 mL/Hr) IV Continuous <Continuous>  thiamine 100 milliGRAM(s) Oral daily  aspirin enteric coated 81 milliGRAM(s) Oral daily  simvastatin 20 milliGRAM(s) Oral at bedtime  pantoprazole   Suspension 40 milliGRAM(s) Oral daily  losartan 50 milliGRAM(s) Oral daily    MEDICATIONS  (PRN):  ondansetron Injectable 4 milliGRAM(s) IV Push every 6 hours PRN Nausea  LORazepam   Injectable 2 milliGRAM(s) IV Push every 1 hour PRN CIWA-Ar score 8 or greater  morphine  - Injectable 2 milliGRAM(s) IV Push every 4 hours PRN Moderate Pain (4 - 6)  HYDROmorphone  Injectable 0.5 milliGRAM(s) IV Push every 4 hours PRN Severe Pain (7 - 10)      Vital Signs Last 24 Hrs  T(C): 37 (26 Jul 2017 07:56), Max: 37 (26 Jul 2017 07:56)  T(F): 98.6 (26 Jul 2017 07:56), Max: 98.6 (26 Jul 2017 07:56)  HR: 85 (26 Jul 2017 07:56) (79 - 119)  BP: 161/88 (26 Jul 2017 07:56) (150/82 - 178/107)  BP(mean): --  RR: 18 (26 Jul 2017 07:56) (16 - 18)  SpO2: 98% (26 Jul 2017 07:56) (93% - 99%)    PHYSICAL EXAM:    Constitutional: NAD, well-groomed, well-developed  HEENT: PERRLA, EOMI, Normal Hearing, MMM  Neck: No LAD, No JVD  Back: Normal spine flexure, No CVA tenderness  Respiratory: CTAB/L  Cardiovascular: S1 and S2, RRR, no M/G/R  Gastrointestinal: BS+, soft, mild LUQ tenderness  Extremities: No peripheral edema  Vascular: 2+ peripheral pulses  Neurological: A/O x 3, no focal deficits no tremors    LABS:                        14.7   10.5  )-----------( 136      ( 26 Jul 2017 08:31 )             41.4     07-25    137  |  99  |  11  ----------------------------<  120<H>  3.3<L>   |  27  |  0.63    Ca    8.8      25 Jul 2017 23:27  Phos  2.6     07-25  Mg     1.8     07-25    TPro  8.0  /  Alb  3.9  /  TBili  0.7  /  DBili  x   /  AST  50<H>  /  ALT  54  /  AlkPhos  91  07-25    PT/INR - ( 25 Jul 2017 23:27 )   PT: 10.8 sec;   INR: 0.99 ratio         PTT - ( 25 Jul 2017 23:27 )  PTT:27.0 sec    Drug Screen Urine:  Alcohol Level  Alcohol, Blood: <10 mg/dL (07-26-17 @ 05:32)        RADIOLOGY & ADDITIONAL STUDIES:      Assessment and Plan:    Alcoholism: No evidence of withdrawal at this time continue Ativan as needed

## 2017-07-27 ENCOUNTER — TRANSCRIPTION ENCOUNTER (OUTPATIENT)
Age: 57
End: 2017-07-27

## 2017-07-27 LAB
ANION GAP SERPL CALC-SCNC: 7 MMOL/L — SIGNIFICANT CHANGE UP (ref 5–17)
BUN SERPL-MCNC: 8 MG/DL — SIGNIFICANT CHANGE UP (ref 7–23)
CALCIUM SERPL-MCNC: 9.1 MG/DL — SIGNIFICANT CHANGE UP (ref 8.5–10.1)
CHLORIDE SERPL-SCNC: 97 MMOL/L — SIGNIFICANT CHANGE UP (ref 96–108)
CO2 SERPL-SCNC: 30 MMOL/L — SIGNIFICANT CHANGE UP (ref 22–31)
CREAT SERPL-MCNC: 0.61 MG/DL — SIGNIFICANT CHANGE UP (ref 0.5–1.3)
CULTURE RESULTS: NO GROWTH — SIGNIFICANT CHANGE UP
GLUCOSE SERPL-MCNC: 155 MG/DL — HIGH (ref 70–99)
HCT VFR BLD CALC: 42.3 % — SIGNIFICANT CHANGE UP (ref 39–50)
HGB BLD-MCNC: 14.9 G/DL — SIGNIFICANT CHANGE UP (ref 13–17)
LIDOCAIN IGE QN: 212 U/L — SIGNIFICANT CHANGE UP (ref 73–393)
MAGNESIUM SERPL-MCNC: 1.8 MG/DL — SIGNIFICANT CHANGE UP (ref 1.6–2.6)
MCHC RBC-ENTMCNC: 34 PG — SIGNIFICANT CHANGE UP (ref 27–34)
MCHC RBC-ENTMCNC: 35.3 GM/DL — SIGNIFICANT CHANGE UP (ref 32–36)
MCV RBC AUTO: 96.3 FL — SIGNIFICANT CHANGE UP (ref 80–100)
PHOSPHATE SERPL-MCNC: 1.5 MG/DL — LOW (ref 2.5–4.5)
PLATELET # BLD AUTO: 149 K/UL — LOW (ref 150–400)
POTASSIUM SERPL-MCNC: 3 MMOL/L — LOW (ref 3.5–5.3)
POTASSIUM SERPL-SCNC: 3 MMOL/L — LOW (ref 3.5–5.3)
RBC # BLD: 4.4 M/UL — SIGNIFICANT CHANGE UP (ref 4.2–5.8)
RBC # FLD: 10.8 % — SIGNIFICANT CHANGE UP (ref 10.3–14.5)
SODIUM SERPL-SCNC: 134 MMOL/L — LOW (ref 135–145)
SPECIMEN SOURCE: SIGNIFICANT CHANGE UP
WBC # BLD: 9.9 K/UL — SIGNIFICANT CHANGE UP (ref 3.8–10.5)
WBC # FLD AUTO: 9.9 K/UL — SIGNIFICANT CHANGE UP (ref 3.8–10.5)

## 2017-07-27 PROCEDURE — 99233 SBSQ HOSP IP/OBS HIGH 50: CPT | Mod: GC

## 2017-07-27 PROCEDURE — 99232 SBSQ HOSP IP/OBS MODERATE 35: CPT

## 2017-07-27 RX ORDER — POTASSIUM CHLORIDE 20 MEQ
40 PACKET (EA) ORAL EVERY 4 HOURS
Qty: 0 | Refills: 0 | Status: COMPLETED | OUTPATIENT
Start: 2017-07-27 | End: 2017-07-27

## 2017-07-27 RX ADMIN — MORPHINE SULFATE 2 MILLIGRAM(S): 50 CAPSULE, EXTENDED RELEASE ORAL at 18:14

## 2017-07-27 RX ADMIN — Medication 3 CAPSULE(S): at 12:33

## 2017-07-27 RX ADMIN — Medication 40 MILLIEQUIVALENT(S): at 13:06

## 2017-07-27 RX ADMIN — PANTOPRAZOLE SODIUM 40 MILLIGRAM(S): 20 TABLET, DELAYED RELEASE ORAL at 17:21

## 2017-07-27 RX ADMIN — Medication 100 MILLIGRAM(S): at 22:41

## 2017-07-27 RX ADMIN — Medication 100 MILLIGRAM(S): at 17:21

## 2017-07-27 RX ADMIN — Medication 40 MILLIEQUIVALENT(S): at 09:52

## 2017-07-27 RX ADMIN — Medication 81 MILLIGRAM(S): at 11:20

## 2017-07-27 RX ADMIN — HYDROMORPHONE HYDROCHLORIDE 0.5 MILLIGRAM(S): 2 INJECTION INTRAMUSCULAR; INTRAVENOUS; SUBCUTANEOUS at 20:33

## 2017-07-27 RX ADMIN — Medication 63.75 MILLIMOLE(S): at 08:31

## 2017-07-27 RX ADMIN — MORPHINE SULFATE 2 MILLIGRAM(S): 50 CAPSULE, EXTENDED RELEASE ORAL at 17:35

## 2017-07-27 RX ADMIN — ENOXAPARIN SODIUM 40 MILLIGRAM(S): 100 INJECTION SUBCUTANEOUS at 11:20

## 2017-07-27 RX ADMIN — HYDROMORPHONE HYDROCHLORIDE 0.5 MILLIGRAM(S): 2 INJECTION INTRAMUSCULAR; INTRAVENOUS; SUBCUTANEOUS at 21:44

## 2017-07-27 RX ADMIN — Medication 3 CAPSULE(S): at 17:21

## 2017-07-27 RX ADMIN — LOSARTAN POTASSIUM 50 MILLIGRAM(S): 100 TABLET, FILM COATED ORAL at 05:25

## 2017-07-27 RX ADMIN — SIMVASTATIN 20 MILLIGRAM(S): 20 TABLET, FILM COATED ORAL at 21:42

## 2017-07-27 RX ADMIN — Medication 3 CAPSULE(S): at 08:31

## 2017-07-27 RX ADMIN — ZOLPIDEM TARTRATE 5 MILLIGRAM(S): 10 TABLET ORAL at 22:43

## 2017-07-27 NOTE — DISCHARGE NOTE ADULT - HOSPITAL COURSE
55 y/o male with PMH HTN, HLD who presents to the ED with abdominal pain. Admitted to [facility] for acute pancreatitis, nausea/vomiting, Alcohol use disorder, tobacco use disorder. Pt admits to drinking 3 martinis daily and had 2 drinks prior to onset of abdominal pain. Pt also admits to smoking 1.5 - 2 packs per day of cigarettes.     CT Abd/Pelvis showed previous Whipple surgery, pancreatic inflammation, fatty liver and hepatomegaly.    Dr. Nava (GI) consulted and recommended conservative management for pancreatitis. Pt initially kept NPO and given banana bag.    Dr. Glynn (addiction medicine) consulted to  patient and monitor for alcohol withdrawal. Pt maintained CIWA score of 0-1 throughout admission.    7/26: Pt diet was advanced to clear liquids. Tolerated well.     7/27: Pt diet advanced to full liquids. Tolerated diet well. Abd pain completely resolved. Stable for d/c home today with outpatient followup.    Physical Exam:  GENERAL: No Acute Distress  HEENT:  EOMI, mucous membranes moist  CHEST/LUNG:  Clear To Auscultation b/l, no rales, wheezes, or rhonchi  HEART:  Regular Rate Rhythm, S1, S2, [-]murmur  ABDOMEN:  Bowel Sounds+, soft, Non-Tender, Non-Distended  EXTREMITIES: no edema or calf tenderness  SKIN:  no rash  NERVOUS SYSTEM: AA&Ox3 55 y/o male with PMH HTN, HLD who presents to the ED with abdominal pain. Admitted to [facility] for acute pancreatitis, nausea/vomiting, Alcohol use disorder, tobacco use disorder. Pt admits to drinking 3 martinis daily and had 2 drinks prior to onset of abdominal pain. Pt also admits to smoking 1.5 - 2 packs per day of cigarettes.     CT Abd/Pelvis showed previous Whipple surgery, pancreatic inflammation, fatty liver and hepatomegaly.    Dr. Nava (GI) consulted and recommended conservative management for pancreatitis. Pt initially kept NPO and given banana bag.    Dr. Glynn (addiction medicine) consulted to  patient and monitor for alcohol withdrawal. Pt maintained CIWA score of 0-1 throughout admission.    7/26: Pt diet was advanced to clear liquids. Tolerated well.     7/27: Pt diet advanced to full liquids. Tolerated diet well. Abd pain completely resolved.     7/28: Pt advanced to regular diet and tolerated well. Lipase normal this AM. Stable for d/c home today with outpatient followup.    Physical Exam:  GENERAL: No Acute Distress  HEENT:  EOMI, mucous membranes moist  CHEST/LUNG:  Clear To Auscultation b/l, no rales, wheezes, or rhonchi  HEART:  Regular Rate Rhythm, S1, S2, [-]murmur  ABDOMEN:  Bowel Sounds+, soft, Non-Tender, Non-Distended  EXTREMITIES: no edema or calf tenderness  SKIN:  no rash  NERVOUS SYSTEM: AA&Ox3 57 y/o male with PMH HTN, HLD who presents to the ED with abdominal pain. Admitted to [facility] for acute pancreatitis, nausea/vomiting, Alcohol use disorder, tobacco use disorder. Pt admits to drinking 3 martinis daily and had 2 drinks prior to onset of abdominal pain. Pt also admits to smoking 1.5 - 2 packs per day of cigarettes.     CT Abd/Pelvis showed previous Whipple surgery, pancreatic inflammation, fatty liver and hepatomegaly.    Dr. Nava (GI) consulted and recommended conservative management for pancreatitis. Pt initially kept NPO and given banana bag.    Dr. Glynn (addiction medicine) consulted to  patient and monitor for alcohol withdrawal. Pt maintained CIWA score of 0-1 throughout admission.    7/26: Pt diet was advanced to clear liquids. Tolerated well.     7/27: Pt diet advanced to full liquids. Tolerated diet well. Abd pain completely resolved.     7/28: Pt advanced to regular diet and tolerated well. Lipase normal this AM. Stable for d/c home today with outpatient followup.    Physical Exam:  GENERAL: No Acute Distress  HEENT:  EOMI, mucous membranes moist  CHEST/LUNG:  Clear To Auscultation b/l, no rales, wheezes, or rhonchi  HEART:  Regular Rate Rhythm, S1, S2, [-]murmur  ABDOMEN:  Bowel Sounds+, soft, Non-Tender, Non-Distended  EXTREMITIES: no edema or calf tenderness  SKIN:  no rash  NERVOUS SYSTEM: AA&Ox3    Time spent: 60 minutes  PMD office informed of discharge

## 2017-07-27 NOTE — CHART NOTE - NSCHARTNOTEFT_GEN_A_CORE
HPI:  Patient is a 55 y/o male with PMH HTN, HLD, who presents to the ED with abdominal pain. The pain suddenly began today at 2:00 PM and progressively worsened. The pain is a constant pain, located in right and left lower abdominal quadrants. The pain is rated 9 out of 10, not relieved by Morphine. The patient has no prior history of a similar pain. He admits to decreased appetite and vomiting. He denies fever, chills, shortness of breath, chest pain, palpitations, nausea, constipation, diarrhea or weakness.     In the ED vitals were: T 97.7 HR 79, /82, RR 16, SpO2 99% on room air. Abnormal labs include: Lipase 2106, Amylase 168, WBC 11.9, K 3.3, Glucose 120. CT Ab/pelvis w/ PO & IV contrast - peripancreatic inflammation w/ trace free fluid, suggests pancreatitis; h/o whipple procedure. Fluids given include: 2 L NSS IV Bolus, 1 L NSS with thiamine @ 100 mL/hr , folic acid, multivitamin additive. UCX collected.     Pt does admit to drinking Etoh prior to getting the abdominal pain.  He drinks 3 martini glasses a day.  Per ER provider, patient had drunk more than usual prior to painful event.  Patient reports having only one drink. (26 Jul 2017 05:02)    INTERIM DETOX HPI:  Pt denies having any withdrawal symptoms. He currently has no abdominal pain  Allergies    No Known Allergies    Intolerances        REVIEW OF SYSTEMS:    Constitutional: No fever, weight loss or fatigue  Respiratory: No cough, wheezing, chills or hemoptysis  Cardiovascular: No chest pain, palpitations, shortness of breath, dizziness or leg swelling  Gastrointestinal: No abdominal or epigastric pain. No nausea, vomiting or hematemesis; No diarrhea or constipation. No melena or hematochezia.  Neurological: No headaches, memory loss, loss of strength, numbness or tremors  Musculoskeletal: No joint pain or swelling; No muscle, back or extremity pain  Psychiatric: No depression, anxiety, mood swings or difficulty sleeping    MEDICATIONS  (STANDING):  enoxaparin Injectable 40 milliGRAM(s) SubCutaneous every 24 hours  sodium chloride 0.9% 1000 milliLiter(s) (100 mL/Hr) IV Continuous <Continuous>  thiamine 100 milliGRAM(s) Oral daily  aspirin enteric coated 81 milliGRAM(s) Oral daily  simvastatin 20 milliGRAM(s) Oral at bedtime  pantoprazole   Suspension 40 milliGRAM(s) Oral daily  losartan 50 milliGRAM(s) Oral daily  amylase/lipase/protease  (CREON 12,000 Units) 3 Capsule(s) Oral three times a day with meals  potassium chloride    Tablet ER 40 milliEquivalent(s) Oral every 4 hours    MEDICATIONS  (PRN):  ondansetron Injectable 4 milliGRAM(s) IV Push every 6 hours PRN Nausea  LORazepam   Injectable 2 milliGRAM(s) IV Push every 1 hour PRN CIWA-Ar score 8 or greater  morphine  - Injectable 2 milliGRAM(s) IV Push every 4 hours PRN Moderate Pain (4 - 6)  HYDROmorphone  Injectable 0.5 milliGRAM(s) IV Push every 4 hours PRN Severe Pain (7 - 10)  zolpidem 5 milliGRAM(s) Oral at bedtime PRN Insomnia      Vital Signs Last 24 Hrs  T(C): 37.4 (27 Jul 2017 05:03), Max: 37.4 (27 Jul 2017 05:03)  T(F): 99.3 (27 Jul 2017 05:03), Max: 99.3 (27 Jul 2017 05:03)  HR: 71 (27 Jul 2017 05:03) (67 - 87)  BP: 158/84 (27 Jul 2017 05:03) (131/82 - 158/84)  BP(mean): --  RR: 17 (27 Jul 2017 05:03) (17 - 18)  SpO2: 98% (27 Jul 2017 05:03) (95% - 98%)    PHYSICAL EXAM:    Constitutional: NAD, well-groomed, well-developed  HEENT: PERRLA, EOMI, Normal Hearing, MMM  Respiratory: CTAB/L  Cardiovascular: S1 and S2, RRR, no M/G/R  Gastrointestinal: BS+, soft, NT/ND  Extremities: No peripheral edema  Vascular: 2+ peripheral pulses  Neurological: A/O x 3, no focal deficits, no tremors    LABS:                        14.7   10.5  )-----------( 136      ( 26 Jul 2017 08:31 )             41.4     07-27    134<L>  |  97  |  8   ----------------------------<  155<H>  3.0<L>   |  30  |  0.61    Ca    9.1      27 Jul 2017 06:45  Phos  1.5     07-27  Mg     1.8     07-27    TPro  7.0  /  Alb  3.3  /  TBili  0.8  /  DBili  x   /  AST  32  /  ALT  43  /  AlkPhos  80  07-26    PT/INR - ( 25 Jul 2017 23:27 )   PT: 10.8 sec;   INR: 0.99 ratio         PTT - ( 25 Jul 2017 23:27 )  PTT:27.0 sec      RADIOLOGY & ADDITIONAL STUDIES:    ASSESSMENT AND PLAN:  No alcohol withdrawal symptoms.                                             Not requiring PRN Ativan

## 2017-07-27 NOTE — DISCHARGE NOTE ADULT - ADDITIONAL INSTRUCTIONS
FOLLOW UP WITH GI within 1 week of discharge  follow up with PMD within 1 week of discharge  pt to setup all follow up appts

## 2017-07-27 NOTE — PROGRESS NOTE ADULT - PROBLEM SELECTOR PLAN 1
mild acute pancreatitis  likely secondary etoh   aggressive IVF hydration  advance diet to full liquid diet, pain management.

## 2017-07-27 NOTE — DISCHARGE NOTE ADULT - PATIENT PORTAL LINK FT
“You can access the [facility] Patient Portal, offered by [facility], by registering with the following website: [email or web address]”

## 2017-07-27 NOTE — DISCHARGE NOTE ADULT - CARE PROVIDERS DIRECT ADDRESSES
,DirectAddress_Unknown,[email or web address],DirectAddress_Unknown ,DirectAddress_Unknown,DirectAddress_Unknown,[email or web address]

## 2017-07-27 NOTE — PROGRESS NOTE ADULT - ASSESSMENT
57 y/o male with PMH HTN, HLD, who presents to the ED with abdominal pain. Admitted to [facility] for acute pancreatitis, nausea/vomiting, Alcohol use disorder, tobacco use disorder. 57 y/o male with PMH HTN, HLD, etoh dependence who presents to the ED with abdominal pain. Admitted to [facility] for acute pancreatitis, nausea/vomiting, Alcohol use disorder, tobacco use disorder.

## 2017-07-27 NOTE — DISCHARGE NOTE ADULT - NS AS ACTIVITY OBS
No Heavy lifting/straining/Showering allowed/Stairs allowed/Bathing allowed/Walking-Indoors allowed/Walking-Outdoors allowed

## 2017-07-27 NOTE — PROGRESS NOTE ADULT - PROBLEM SELECTOR PLAN 2
PRN ativan not needed overnight, CIWA 0 this morning  No current signs of withdrawal  Will continue to monitor

## 2017-07-27 NOTE — DISCHARGE NOTE ADULT - MEDICATION SUMMARY - MEDICATIONS TO TAKE
I will START or STAY ON the medications listed below when I get home from the hospital:    Aspirin Enteric Coated 81 mg oral delayed release tablet  -- 1 tab(s) by mouth once a day  -- Indication: For Per pmd    losartan 50 mg oral tablet  -- 1 tab(s) by mouth once a day  -- Indication: For Per pmd    simvastatin  -- 20  by mouth once a day (at bedtime)  -- Indication: For Per pmd    Norvasc  --  by mouth   -- Indication: For Per pmd    Creon  --  by mouth   -- Indication: For Per pmd    Protonix 40 mg oral granule, enteric coated  -- 1 each by mouth once a day  -- It is very important that you take or use this exactly as directed.  Do not skip doses or discontinue unless directed by your doctor.  Obtain medical advice before taking any non-prescription drugs as some may affect the action of this medication.    -- Indication: For Per pmd    Wellbutrin  --  by mouth   -- Indication: For Per pmd I will START or STAY ON the medications listed below when I get home from the hospital:    Aspirin Enteric Coated 81 mg oral delayed release tablet  -- 1 tab(s) by mouth once a day  -- Indication: For For CAD    losartan 50 mg oral tablet  -- 1 tab(s) by mouth once a day  -- Indication: For HTN (hypertension)    simvastatin  -- 20  by mouth once a day (at bedtime)  -- Indication: For For HLD    Norvasc  --  by mouth   -- Indication: For For HTN    Creon  --  by mouth   -- Indication: For Hx of pancreatic ca    Protonix 40 mg oral granule, enteric coated  -- 1 each by mouth once a day  -- It is very important that you take or use this exactly as directed.  Do not skip doses or discontinue unless directed by your doctor.  Obtain medical advice before taking any non-prescription drugs as some may affect the action of this medication.    -- Indication: For Gerd    Wellbutrin  --  by mouth   -- Indication: For Smoking cessation I will START or STAY ON the medications listed below when I get home from the hospital:    Aspirin Enteric Coated 81 mg oral delayed release tablet  -- 1 tab(s) by mouth once a day  -- Indication: For Hyperlipidemia    losartan 50 mg oral tablet  -- 1 tab(s) by mouth once a day  -- Indication: For HTN (hypertension)    simvastatin  -- 20  by mouth once a day (at bedtime)  -- Indication: For For HLD    Norvasc  --  by mouth   -- Indication: For For HTN    Creon  --  by mouth   -- Indication: For Hx of pancreatic ca    Protonix 40 mg oral granule, enteric coated  -- 1 each by mouth once a day  -- It is very important that you take or use this exactly as directed.  Do not skip doses or discontinue unless directed by your doctor.  Obtain medical advice before taking any non-prescription drugs as some may affect the action of this medication.    -- Indication: For Gerd    Wellbutrin  --  by mouth   -- Indication: For Smoking cessation

## 2017-07-27 NOTE — PROGRESS NOTE ADULT - ATTENDING COMMENTS
57 y/o male with PMH HTN, HLD, etoh dependence who presents to the ED with abdominal pain. Admitted to [facility] for acute pancreatitis, nausea/vomiting, Alcohol use disorder, tobacco use disorder. Plan: advance diet as tolerated, once tolerating po, monitor clinical course, ciwa scoring not scoring, likely dc tomorrow

## 2017-07-27 NOTE — DISCHARGE NOTE ADULT - PLAN OF CARE
Return to Baseline ADLs Limit alcohol use after discharge from hospital  Follow up with gastroenterologist within one week following discharge from hospital  Follow up with PMD in one week following discharge from hospital Limit alcohol use Follow up with addiction medicine as needed following discharge from hospital for help with quitting Continue zocor Continue losartan Limit alcohol use  Follow up with addiction medicine as needed following discharge from hospital for help with quitting Continue zocor and aspirin Continue losartan and aspirin Limit alcohol use after discharge from hospital  Follow up with gastroenterologist (Dr. Oconnor) within one week following discharge from hospital  Follow up with PMD in one week following discharge from hospital

## 2017-07-27 NOTE — PROGRESS NOTE ADULT - SUBJECTIVE AND OBJECTIVE BOX
Patient is a 56y old  Male who presents with a chief complaint of abdominal pain (26 Jul 2017 05:02)      INTERVAL HPI/OVERNIGHT EVENTS: Pt S+E at bedside. Resting comfortably, no overnight events. Pt states his abd pain has completely resolved. Pt tolerated clear liquid diet well yesterday, will advance to full liquids this morning. Denies fever/chills, CP, SOB, N/V/D.    MEDICATIONS  (STANDING):  enoxaparin Injectable 40 milliGRAM(s) SubCutaneous every 24 hours  sodium chloride 0.9% 1000 milliLiter(s) (100 mL/Hr) IV Continuous <Continuous>  thiamine 100 milliGRAM(s) Oral daily  aspirin enteric coated 81 milliGRAM(s) Oral daily  simvastatin 20 milliGRAM(s) Oral at bedtime  pantoprazole   Suspension 40 milliGRAM(s) Oral daily  losartan 50 milliGRAM(s) Oral daily  amylase/lipase/protease  (CREON 12,000 Units) 3 Capsule(s) Oral three times a day with meals  potassium chloride    Tablet ER 40 milliEquivalent(s) Oral every 4 hours    MEDICATIONS  (PRN):  ondansetron Injectable 4 milliGRAM(s) IV Push every 6 hours PRN Nausea  LORazepam   Injectable 2 milliGRAM(s) IV Push every 1 hour PRN CIWA-Ar score 8 or greater  morphine  - Injectable 2 milliGRAM(s) IV Push every 4 hours PRN Moderate Pain (4 - 6)  HYDROmorphone  Injectable 0.5 milliGRAM(s) IV Push every 4 hours PRN Severe Pain (7 - 10)  zolpidem 5 milliGRAM(s) Oral at bedtime PRN Insomnia      Allergies    No Known Allergies    Intolerances        REVIEW OF SYSTEMS:  CONSTITUTIONAL: No fever or chills  HEENT:  No headache, no sore throat  RESPIRATORY: No cough, wheezing, or shortness of breath  CARDIOVASCULAR: No chest pain, palpitations, or leg swelling  GASTROINTESTINAL: No nausea, vomiting, or diarrhea, no abd pain  GENITOURINARY: No dysuria, frequency, or hematuria  NEUROLOGICAL: no focal weakness or dizziness  SKIN:  No rashes or lesions   MUSCULOSKELETAL: no myalgias   PSYCHIATRIC: No depression or anxiety  ROS Unable to obtain due to - [ ] Dementia  [ ] Lethargy  REST OF REVIEW Of SYSTEM - [x] Normal     Vital Signs Last 24 Hrs  T(C): 37.4 (27 Jul 2017 05:03), Max: 37.4 (27 Jul 2017 05:03)  T(F): 99.3 (27 Jul 2017 05:03), Max: 99.3 (27 Jul 2017 05:03)  HR: 71 (27 Jul 2017 05:03) (67 - 87)  BP: 158/84 (27 Jul 2017 05:03) (131/82 - 158/84)  BP(mean): --  RR: 17 (27 Jul 2017 05:03) (17 - 18)  SpO2: 98% (27 Jul 2017 05:03) (95% - 98%)    PHYSICAL EXAM:  GENERAL: No Acute Distress  HEENT:  EOMI, mucous membranes moist  CHEST/LUNG:  Clear To Auscultation b/l, no rales, wheezes, or rhonchi  HEART:  Regular Rate Rhythm, S1, S2, [-]murmur  ABDOMEN:  Bowel Sounds+, soft, Non-Tender, Non-Distended  EXTREMITIES: no edema or calf tenderness  SKIN:  no rash  NERVOUS SYSTEM: AA&Ox3    DIET:     LABS:                        14.9   9.9   )-----------( 149      ( 27 Jul 2017 06:45 )             42.3     CBC Full  -  ( 27 Jul 2017 06:45 )  WBC Count : 9.9 K/uL  Hemoglobin : 14.9 g/dL  Hematocrit : 42.3 %  Platelet Count - Automated : 149 K/uL  Mean Cell Volume : 96.3 fl  Mean Cell Hemoglobin : 34.0 pg  Mean Cell Hemoglobin Concentration : 35.3 gm/dL  Auto Neutrophil # : x  Auto Lymphocyte # : x  Auto Monocyte # : x  Auto Eosinophil # : x  Auto Basophil # : x  Auto Neutrophil % : x  Auto Lymphocyte % : x  Auto Monocyte % : x  Auto Eosinophil % : x  Auto Basophil % : x    27 Jul 2017 06:45    134    |  97     |  8      ----------------------------<  155    3.0     |  30     |  0.61     Ca    9.1        27 Jul 2017 06:45  Phos  1.5       27 Jul 2017 06:45  Mg     1.8       27 Jul 2017 06:45      PT/INR - ( 25 Jul 2017 23:27 )   PT: 10.8 sec;   INR: 0.99 ratio         PTT - ( 25 Jul 2017 23:27 )  PTT:27.0 sec    CAPILLARY BLOOD GLUCOSE            RADIOLOGY & ADDITIONAL TESTS:    Personally reviewed.     Consultant(s) Notes Reviewed:  [x] YES  [ ] NO    Care Discussed with [ ] Consultants  [x] Patient  [ ] Family  [x]      [ ] Other; RN  DVT ppx  Advanced directive Patient is a 56y old  Male who presents with a chief complaint of abdominal pain (26 Jul 2017 05:02)      INTERVAL HPI/OVERNIGHT EVENTS: Pt S+E at bedside. Resting comfortably, no overnight events. Pt states his abd pain has completely resolved. Pt tolerated clear liquid diet well yesterday, will advance to full liquids this morning. Denies fever/chills, CP, SOB, N/V/D. States he does not plan to drink etoh anymore.     MEDICATIONS  (STANDING):  enoxaparin Injectable 40 milliGRAM(s) SubCutaneous every 24 hours  sodium chloride 0.9% 1000 milliLiter(s) (100 mL/Hr) IV Continuous <Continuous>  thiamine 100 milliGRAM(s) Oral daily  aspirin enteric coated 81 milliGRAM(s) Oral daily  simvastatin 20 milliGRAM(s) Oral at bedtime  pantoprazole   Suspension 40 milliGRAM(s) Oral daily  losartan 50 milliGRAM(s) Oral daily  amylase/lipase/protease  (CREON 12,000 Units) 3 Capsule(s) Oral three times a day with meals  potassium chloride    Tablet ER 40 milliEquivalent(s) Oral every 4 hours    MEDICATIONS  (PRN):  ondansetron Injectable 4 milliGRAM(s) IV Push every 6 hours PRN Nausea  LORazepam   Injectable 2 milliGRAM(s) IV Push every 1 hour PRN CIWA-Ar score 8 or greater  morphine  - Injectable 2 milliGRAM(s) IV Push every 4 hours PRN Moderate Pain (4 - 6)  HYDROmorphone  Injectable 0.5 milliGRAM(s) IV Push every 4 hours PRN Severe Pain (7 - 10)  zolpidem 5 milliGRAM(s) Oral at bedtime PRN Insomnia      Allergies    No Known Allergies    Intolerances        REVIEW OF SYSTEMS:  CONSTITUTIONAL: No fever or chills  HEENT:  No headache, no sore throat  RESPIRATORY: No cough, wheezing, or shortness of breath  CARDIOVASCULAR: No chest pain, palpitations, or leg swelling  GASTROINTESTINAL: No nausea, vomiting, or diarrhea, no abd pain  GENITOURINARY: No dysuria, frequency, or hematuria  NEUROLOGICAL: no focal weakness or dizziness  SKIN:  No rashes or lesions   MUSCULOSKELETAL: no myalgias   PSYCHIATRIC: No depression or anxiety  ROS Unable to obtain due to - [ ] Dementia  [ ] Lethargy  REST OF REVIEW Of SYSTEM - [x] Normal     Vital Signs Last 24 Hrs  T(C): 37.4 (27 Jul 2017 05:03), Max: 37.4 (27 Jul 2017 05:03)  T(F): 99.3 (27 Jul 2017 05:03), Max: 99.3 (27 Jul 2017 05:03)  HR: 71 (27 Jul 2017 05:03) (67 - 87)  BP: 158/84 (27 Jul 2017 05:03) (131/82 - 158/84)  BP(mean): --  RR: 17 (27 Jul 2017 05:03) (17 - 18)  SpO2: 98% (27 Jul 2017 05:03) (95% - 98%)    PHYSICAL EXAM:  GENERAL: No Acute Distress  HEENT:  EOMI, mucous membranes moist  CHEST/LUNG:  Clear To Auscultation b/l, no rales, wheezes, or rhonchi  HEART:  Regular Rate Rhythm, S1, S2, [-]murmur  ABDOMEN:  Bowel Sounds+, soft, Non-Tender, Non-Distended  EXTREMITIES: no edema or calf tenderness  SKIN:  no rash  NERVOUS SYSTEM: AA&Ox3    DIET:     LABS:                        14.9   9.9   )-----------( 149      ( 27 Jul 2017 06:45 )             42.3     CBC Full  -  ( 27 Jul 2017 06:45 )  WBC Count : 9.9 K/uL  Hemoglobin : 14.9 g/dL  Hematocrit : 42.3 %  Platelet Count - Automated : 149 K/uL  Mean Cell Volume : 96.3 fl  Mean Cell Hemoglobin : 34.0 pg  Mean Cell Hemoglobin Concentration : 35.3 gm/dL  Auto Neutrophil # : x  Auto Lymphocyte # : x  Auto Monocyte # : x  Auto Eosinophil # : x  Auto Basophil # : x  Auto Neutrophil % : x  Auto Lymphocyte % : x  Auto Monocyte % : x  Auto Eosinophil % : x  Auto Basophil % : x    27 Jul 2017 06:45    134    |  97     |  8      ----------------------------<  155    3.0     |  30     |  0.61     Ca    9.1        27 Jul 2017 06:45  Phos  1.5       27 Jul 2017 06:45  Mg     1.8       27 Jul 2017 06:45      PT/INR - ( 25 Jul 2017 23:27 )   PT: 10.8 sec;   INR: 0.99 ratio         PTT - ( 25 Jul 2017 23:27 )  PTT:27.0 sec    CAPILLARY BLOOD GLUCOSE            RADIOLOGY & ADDITIONAL TESTS:    Personally reviewed.     Consultant(s) Notes Reviewed:  [x] YES  [ ] NO    Care Discussed with [ ] Consultants  [x] Patient  [ ] Family  [x]      [ ] Other; RN  DVT ppx  Advanced directive

## 2017-07-27 NOTE — PROGRESS NOTE ADULT - PROBLEM SELECTOR PLAN 1
Pt abd pain has resolved, tolerating clear liquid diet well  Advanced to full liquids this morning  Lipase this AM: 212  Pain control as needed  Zofran PRN for nausea  Will continue to monitor and further advance diet as tolerated

## 2017-07-27 NOTE — PROGRESS NOTE ADULT - PROBLEM SELECTOR PLAN 2
s/p Whipple's; unlikely to be etiology of pancreatitis. s/p Whipple's; unlikely to be etiology of pancreatitis.  no evidence of recurrence

## 2017-07-27 NOTE — DISCHARGE NOTE ADULT - CARE PLAN
Principal Discharge DX:	Acute pancreatitis  Goal:	Return to Baseline ADLs  Instructions for follow-up, activity and diet:	Limit alcohol use after discharge from hospital  Follow up with gastroenterologist within one week following discharge from hospital  Follow up with PMD in one week following discharge from hospital  Secondary Diagnosis:	Alcohol use disorder  Instructions for follow-up, activity and diet:	Limit alcohol use  Secondary Diagnosis:	Tobacco use disorder  Instructions for follow-up, activity and diet:	Follow up with addiction medicine as needed following discharge from hospital for help with quitting  Secondary Diagnosis:	Hyperlipidemia  Instructions for follow-up, activity and diet:	Continue zocor  Secondary Diagnosis:	HTN (hypertension)  Instructions for follow-up, activity and diet:	Continue losartan Principal Discharge DX:	Acute pancreatitis  Goal:	Return to Baseline ADLs  Instructions for follow-up, activity and diet:	Limit alcohol use after discharge from hospital  Follow up with gastroenterologist within one week following discharge from hospital  Follow up with PMD in one week following discharge from hospital  Secondary Diagnosis:	Alcohol use disorder  Instructions for follow-up, activity and diet:	Limit alcohol use  Follow up with addiction medicine as needed following discharge from hospital for help with quitting  Secondary Diagnosis:	Tobacco use disorder  Instructions for follow-up, activity and diet:	Follow up with addiction medicine as needed following discharge from hospital for help with quitting  Secondary Diagnosis:	Hyperlipidemia  Instructions for follow-up, activity and diet:	Continue zocor and aspirin  Secondary Diagnosis:	HTN (hypertension)  Instructions for follow-up, activity and diet:	Continue losartan and aspirin Principal Discharge DX:	Acute pancreatitis  Goal:	Return to Baseline ADLs  Instructions for follow-up, activity and diet:	Limit alcohol use after discharge from hospital  Follow up with gastroenterologist (Dr. Oconnor) within one week following discharge from hospital  Follow up with PMD in one week following discharge from hospital  Secondary Diagnosis:	Alcohol use disorder  Instructions for follow-up, activity and diet:	Limit alcohol use  Follow up with addiction medicine as needed following discharge from hospital for help with quitting  Secondary Diagnosis:	Tobacco use disorder  Instructions for follow-up, activity and diet:	Follow up with addiction medicine as needed following discharge from hospital for help with quitting  Secondary Diagnosis:	Hyperlipidemia  Instructions for follow-up, activity and diet:	Continue zocor and aspirin  Secondary Diagnosis:	HTN (hypertension)  Instructions for follow-up, activity and diet:	Continue losartan and aspirin

## 2017-07-27 NOTE — DISCHARGE NOTE ADULT - PROVIDER TOKENS
TOKEN:'[id number]:'[id number]' TOKEN:'[id number]:'[id number]' TOKEN:'[id number]:'[id number]:'[id number]'

## 2017-07-27 NOTE — PROGRESS NOTE ADULT - SUBJECTIVE AND OBJECTIVE BOX
Interval Events: Abdominal pain improved    HPI: Patient is a 55 y/o male with PMH HTN, HLD, who presents to the ED with abdominal pain. The pain suddenly began yesterday afternoon and progressively worsened. The pain is a constant pain, located in epigastrium, radiating to the back in band like manner associated with nausea. The pain is rated 9 out of 10, not relieved by Morphine. The patient has no prior history of a similar pain. He admits to decreased appetite and vomiting. He denies fever, chills, shortness of breath, chest pain, palpitations, nausea, constipation/diarrhea, dysphagia, weight loss, blood in stools. (+) h/o pancreatic cancer s/p Whipple's 2 years ago. (+) daily etoh use.    MEDICATIONS  (STANDING):  enoxaparin Injectable 40 milliGRAM(s) SubCutaneous every 24 hours  sodium chloride 0.9% 1000 milliLiter(s) (100 mL/Hr) IV Continuous <Continuous>  thiamine 100 milliGRAM(s) Oral daily  aspirin enteric coated 81 milliGRAM(s) Oral daily  simvastatin 20 milliGRAM(s) Oral at bedtime  pantoprazole   Suspension 40 milliGRAM(s) Oral daily  losartan 50 milliGRAM(s) Oral daily  amylase/lipase/protease  (CREON 12,000 Units) 3 Capsule(s) Oral three times a day with meals  potassium chloride    Tablet ER 40 milliEquivalent(s) Oral every 4 hours    MEDICATIONS  (PRN):  ondansetron Injectable 4 milliGRAM(s) IV Push every 6 hours PRN Nausea  LORazepam   Injectable 2 milliGRAM(s) IV Push every 1 hour PRN CIWA-Ar score 8 or greater  morphine  - Injectable 2 milliGRAM(s) IV Push every 4 hours PRN Moderate Pain (4 - 6)  HYDROmorphone  Injectable 0.5 milliGRAM(s) IV Push every 4 hours PRN Severe Pain (7 - 10)  zolpidem 5 milliGRAM(s) Oral at bedtime PRN Insomnia      Allergies    No Known Allergies    Intolerances    ROS    General:  No wt loss, fevers, chills, night sweats, fatigue,   Eyes:  Good vision, no reported pain  ENT:  No sore throat, pain, runny nose, dysphagia  CV:  No pain, palpitations, hypo/hypertension  Resp:  No dyspnea, cough, tachypnea, wheezing  GI:  abd pain, nausea  :  No pain, bleeding, incontinence, nocturia  Muscle:  No pain, weakness  Neuro:  No weakness, tingling, memory problems  Psych:  No fatigue, insomnia, mood problems, depression  Endocrine:  No polyuria, polydipsia, cold/heat intolerance  Heme:  No petechiae, ecchymosis, easy bruisability  Skin:  No rash, tattoos, scars, edema  Vital Signs Last 24 Hrs  T(C): 37.4 (2017 05:03), Max: 37.4 (2017 05:03)  T(F): 99.3 (2017 05:03), Max: 99.3 (2017 05:03)  HR: 71 (2017 05:03) (67 - 87)  BP: 158/84 (2017 05:03) (131/82 - 158/84)  BP(mean): --  RR: 17 (2017 05:03) (17 - 18)  SpO2: 98% (2017 05:03) (95% - 98%)    PHYSICAL EXAM:    GENERAL:  Appears stated age, well-groomed, well-nourished, no distress  HEENT:  NC/AT,  conjunctivae clear and pink, no thyromegaly, nodules, adenopathy, no JVD, sclera -anicteric  CHEST:  Full & symmetric excursion, no increased effort, breath sounds clear  HEART:  Regular rhythm, S1, S2, no murmur/rub/S3/S4, no abdominal bruit, no edema  ABDOMEN:  Soft, mild epigastric tenderness, non-distended, normoactive bowel sounds,  no masses ,no hepato-splenomegaly, no signs of chronic liver disease  EXTEREMITIES:  no cyanosis,clubbing or edema  SKIN:  No rash/erythema/ecchymoses/petechiae/wounds/abscess/warm/dry  NEURO:  Alert, oriented, no asterixis, no tremor, no encephalopathy    LABS:                        14.9   9.9   )-----------( 149      ( 2017 06:45 )             42.3     07    134<L>  |  97  |  8   ----------------------------<  155<H>  3.0<L>   |  30  |  0.61    Ca    9.1      2017 06:45  Phos  1.5       Mg     1.8         TPro  7.0  /  Alb  3.3  /  TBili  0.8  /  DBili  x   /  AST  32  /  ALT  43  /  AlkPhos  80  07-    PT/INR - ( 2017 23:27 )   PT: 10.8 sec;   INR: 0.99 ratio         PTT - ( 2017 23:27 )  PTT:27.0 sec  Urinalysis Basic - ( 2017 11:42 )    Color: Yellow / Appearance: Clear / S.010 / pH: x  Gluc: x / Ketone: Small  / Bili: Negative / Urobili: 8   Blood: x / Protein: Negative / Nitrite: Negative   Leuk Esterase: Negative / RBC: 0-2 /HPF / WBC 0-2   Sq Epi: x / Non Sq Epi: x / Bacteria: x        RADIOLOGY & ADDITIONAL TESTS: Interval Events: Abdominal pain improved, tolerating diet    HPI: Patient is a 57 y/o male with PMH HTN, HLD, who presents to the ED with abdominal pain. The pain suddenly began yesterday afternoon and progressively worsened. The pain is a constant pain, located in epigastrium, radiating to the back in band like manner associated with nausea. The pain is rated 9 out of 10, not relieved by Morphine. The patient has no prior history of a similar pain. He admits to decreased appetite and vomiting. He denies fever, chills, shortness of breath, chest pain, palpitations, nausea, constipation/diarrhea, dysphagia, weight loss, blood in stools. (+) h/o pancreatic cancer s/p Whipple's 2 years ago. (+) daily etoh use.    MEDICATIONS  (STANDING):  enoxaparin Injectable 40 milliGRAM(s) SubCutaneous every 24 hours  sodium chloride 0.9% 1000 milliLiter(s) (100 mL/Hr) IV Continuous <Continuous>  thiamine 100 milliGRAM(s) Oral daily  aspirin enteric coated 81 milliGRAM(s) Oral daily  simvastatin 20 milliGRAM(s) Oral at bedtime  pantoprazole   Suspension 40 milliGRAM(s) Oral daily  losartan 50 milliGRAM(s) Oral daily  amylase/lipase/protease  (CREON 12,000 Units) 3 Capsule(s) Oral three times a day with meals  potassium chloride    Tablet ER 40 milliEquivalent(s) Oral every 4 hours    MEDICATIONS  (PRN):  ondansetron Injectable 4 milliGRAM(s) IV Push every 6 hours PRN Nausea  LORazepam   Injectable 2 milliGRAM(s) IV Push every 1 hour PRN CIWA-Ar score 8 or greater  morphine  - Injectable 2 milliGRAM(s) IV Push every 4 hours PRN Moderate Pain (4 - 6)  HYDROmorphone  Injectable 0.5 milliGRAM(s) IV Push every 4 hours PRN Severe Pain (7 - 10)  zolpidem 5 milliGRAM(s) Oral at bedtime PRN Insomnia      Allergies    No Known Allergies    Intolerances    ROS    General:  No wt loss, fevers, chills, night sweats, fatigue,   Eyes:  Good vision, no reported pain  ENT:  No sore throat, pain, runny nose, dysphagia  CV:  No pain, palpitations, hypo/hypertension  Resp:  No dyspnea, cough, tachypnea, wheezing  GI:  abd pain, nausea  :  No pain, bleeding, incontinence, nocturia  Muscle:  No pain, weakness  Neuro:  No weakness, tingling, memory problems  Psych:  No fatigue, insomnia, mood problems, depression  Endocrine:  No polyuria, polydipsia, cold/heat intolerance  Heme:  No petechiae, ecchymosis, easy bruisability  Skin:  No rash, tattoos, scars, edema  Vital Signs Last 24 Hrs  T(C): 37.4 (2017 05:03), Max: 37.4 (2017 05:03)  T(F): 99.3 (2017 05:03), Max: 99.3 (2017 05:03)  HR: 71 (2017 05:03) (67 - 87)  BP: 158/84 (2017 05:03) (131/82 - 158/84)  BP(mean): --  RR: 17 (2017 05:03) (17 - 18)  SpO2: 98% (2017 05:03) (95% - 98%)    PHYSICAL EXAM:    GENERAL:  Appears stated age, well-groomed, well-nourished, no distress  HEENT:  NC/AT,  conjunctivae clear and pink, no thyromegaly, nodules, adenopathy, no JVD, sclera -anicteric  CHEST:  Full & symmetric excursion, no increased effort, breath sounds clear  HEART:  Regular rhythm, S1, S2, no murmur/rub/S3/S4, no abdominal bruit, no edema  ABDOMEN:  Soft, mild epigastric tenderness, non-distended, normoactive bowel sounds,  no masses ,no hepato-splenomegaly, no signs of chronic liver disease  EXTEREMITIES:  no cyanosis,clubbing or edema  SKIN:  No rash/erythema/ecchymoses/petechiae/wounds/abscess/warm/dry  NEURO:  Alert, oriented, no asterixis, no tremor, no encephalopathy    LABS:                        14.9   9.9   )-----------( 149      ( 2017 06:45 )             42.3     07-    134<L>  |  97  |  8   ----------------------------<  155<H>  3.0<L>   |  30  |  0.61    Ca    9.1      2017 06:45  Phos  1.5       Mg     1.8         TPro  7.0  /  Alb  3.3  /  TBili  0.8  /  DBili  x   /  AST  32  /  ALT  43  /  AlkPhos  80  07-    PT/INR - ( 2017 23:27 )   PT: 10.8 sec;   INR: 0.99 ratio         PTT - ( 2017 23:27 )  PTT:27.0 sec  Urinalysis Basic - ( 2017 11:42 )    Color: Yellow / Appearance: Clear / S.010 / pH: x  Gluc: x / Ketone: Small  / Bili: Negative / Urobili: 8   Blood: x / Protein: Negative / Nitrite: Negative   Leuk Esterase: Negative / RBC: 0-2 /HPF / WBC 0-2   Sq Epi: x / Non Sq Epi: x / Bacteria: x        RADIOLOGY & ADDITIONAL TESTS:

## 2017-07-27 NOTE — DISCHARGE NOTE ADULT - CARE PROVIDER_API CALL
Marcelle Nava), Internal Medicine  [address] [place] [address]  Phone: [phone]  Fax: [phone]    Gus Bundy), Internal Medicine  [address] [place] [address]  Phone: [phone]  Fax: [phone] Gus Bundy), Internal Medicine  [address] [place] [address]  Phone: [phone]  Fax: [phone]    Gilbert Oconnor (), Gastroenterology  [address] [address]  Phone: [phone]  Fax: [phone] Gilbert Oconnor (DO), Gastroenterology  [address] [facility] [address]  [place] [address]  Phone: [phone]  Fax: [phone]    Bharat Longoria (), Internal Medicine  [address] [facility] [address]  [place] [address]  Phone: [phone]  Fax: [phone]    Gus Bundy), Internal Medicine  [address] [place] [address]  Phone: [phone]  Fax: [phone]

## 2017-07-28 VITALS
DIASTOLIC BLOOD PRESSURE: 87 MMHG | SYSTOLIC BLOOD PRESSURE: 143 MMHG | HEART RATE: 64 BPM | TEMPERATURE: 99 F | OXYGEN SATURATION: 98 % | RESPIRATION RATE: 17 BRPM

## 2017-07-28 LAB
ANION GAP SERPL CALC-SCNC: 11 MMOL/L — SIGNIFICANT CHANGE UP (ref 5–17)
BUN SERPL-MCNC: 9 MG/DL — SIGNIFICANT CHANGE UP (ref 7–23)
CALCIUM SERPL-MCNC: 9.2 MG/DL — SIGNIFICANT CHANGE UP (ref 8.5–10.1)
CHLORIDE SERPL-SCNC: 96 MMOL/L — SIGNIFICANT CHANGE UP (ref 96–108)
CO2 SERPL-SCNC: 26 MMOL/L — SIGNIFICANT CHANGE UP (ref 22–31)
CREAT SERPL-MCNC: 0.71 MG/DL — SIGNIFICANT CHANGE UP (ref 0.5–1.3)
GLUCOSE SERPL-MCNC: 112 MG/DL — HIGH (ref 70–99)
HCT VFR BLD CALC: 43.2 % — SIGNIFICANT CHANGE UP (ref 39–50)
HGB BLD-MCNC: 15.4 G/DL — SIGNIFICANT CHANGE UP (ref 13–17)
LIDOCAIN IGE QN: 128 U/L — SIGNIFICANT CHANGE UP (ref 73–393)
MCHC RBC-ENTMCNC: 34.3 PG — HIGH (ref 27–34)
MCHC RBC-ENTMCNC: 35.6 GM/DL — SIGNIFICANT CHANGE UP (ref 32–36)
MCV RBC AUTO: 96.5 FL — SIGNIFICANT CHANGE UP (ref 80–100)
PLATELET # BLD AUTO: 185 K/UL — SIGNIFICANT CHANGE UP (ref 150–400)
POTASSIUM SERPL-MCNC: 3.8 MMOL/L — SIGNIFICANT CHANGE UP (ref 3.5–5.3)
POTASSIUM SERPL-SCNC: 3.8 MMOL/L — SIGNIFICANT CHANGE UP (ref 3.5–5.3)
RBC # BLD: 4.48 M/UL — SIGNIFICANT CHANGE UP (ref 4.2–5.8)
RBC # FLD: 10.8 % — SIGNIFICANT CHANGE UP (ref 10.3–14.5)
SODIUM SERPL-SCNC: 133 MMOL/L — LOW (ref 135–145)
WBC # BLD: 10.3 K/UL — SIGNIFICANT CHANGE UP (ref 3.8–10.5)
WBC # FLD AUTO: 10.3 K/UL — SIGNIFICANT CHANGE UP (ref 3.8–10.5)

## 2017-07-28 PROCEDURE — 87086 URINE CULTURE/COLONY COUNT: CPT

## 2017-07-28 PROCEDURE — 99285 EMERGENCY DEPT VISIT HI MDM: CPT | Mod: 25

## 2017-07-28 PROCEDURE — 93005 ELECTROCARDIOGRAM TRACING: CPT

## 2017-07-28 PROCEDURE — 85027 COMPLETE CBC AUTOMATED: CPT

## 2017-07-28 PROCEDURE — 80053 COMPREHEN METABOLIC PANEL: CPT

## 2017-07-28 PROCEDURE — 81001 URINALYSIS AUTO W/SCOPE: CPT

## 2017-07-28 PROCEDURE — 99239 HOSP IP/OBS DSCHRG MGMT >30: CPT

## 2017-07-28 PROCEDURE — 74177 CT ABD & PELVIS W/CONTRAST: CPT

## 2017-07-28 PROCEDURE — 36415 COLL VENOUS BLD VENIPUNCTURE: CPT

## 2017-07-28 PROCEDURE — 96374 THER/PROPH/DIAG INJ IV PUSH: CPT

## 2017-07-28 PROCEDURE — 96375 TX/PRO/DX INJ NEW DRUG ADDON: CPT

## 2017-07-28 PROCEDURE — 85610 PROTHROMBIN TIME: CPT

## 2017-07-28 PROCEDURE — 96372 THER/PROPH/DIAG INJ SC/IM: CPT | Mod: 59

## 2017-07-28 PROCEDURE — 96376 TX/PRO/DX INJ SAME DRUG ADON: CPT

## 2017-07-28 PROCEDURE — 80048 BASIC METABOLIC PNL TOTAL CA: CPT

## 2017-07-28 PROCEDURE — 83690 ASSAY OF LIPASE: CPT

## 2017-07-28 PROCEDURE — 80307 DRUG TEST PRSMV CHEM ANLYZR: CPT

## 2017-07-28 PROCEDURE — 84100 ASSAY OF PHOSPHORUS: CPT

## 2017-07-28 PROCEDURE — 99232 SBSQ HOSP IP/OBS MODERATE 35: CPT

## 2017-07-28 PROCEDURE — 85730 THROMBOPLASTIN TIME PARTIAL: CPT

## 2017-07-28 PROCEDURE — 83735 ASSAY OF MAGNESIUM: CPT

## 2017-07-28 PROCEDURE — 82150 ASSAY OF AMYLASE: CPT

## 2017-07-28 RX ADMIN — Medication 3 CAPSULE(S): at 08:25

## 2017-07-28 RX ADMIN — Medication 81 MILLIGRAM(S): at 12:17

## 2017-07-28 RX ADMIN — Medication 100 MILLIGRAM(S): at 12:17

## 2017-07-28 RX ADMIN — Medication 3 CAPSULE(S): at 12:18

## 2017-07-28 RX ADMIN — LOSARTAN POTASSIUM 50 MILLIGRAM(S): 100 TABLET, FILM COATED ORAL at 06:19

## 2017-07-28 NOTE — PROGRESS NOTE ADULT - PROBLEM SELECTOR PLAN 3
secondary to pancreatitis; resolved  Improved  zofran 4 mg IV q 6 prn.
Pt admits to smoking 1.5-2 packs a day  Expressed a desire to stop smoking  FU addiction med consult
Pt not requiring nicotine patch  FU addiction med recs
secondary to pancreatitis  Improved  zofran 4 mg IV q 6 prn.

## 2017-07-28 NOTE — PROGRESS NOTE ADULT - SUBJECTIVE AND OBJECTIVE BOX
HPI:  Patient is a 57 y/o male with PMH HTN, HLD, who presents to the ED with abdominal pain. The pain suddenly began today at 2:00 PM and progressively worsened. The pain is a constant pain, located in right and left lower abdominal quadrants. The pain is rated 9 out of 10, not relieved by Morphine. The patient has no prior history of a similar pain. He admits to decreased appetite and vomiting. He denies fever, chills, shortness of breath, chest pain, palpitations, nausea, constipation, diarrhea or weakness.     In the ED vitals were: T 97.7 HR 79, /82, RR 16, SpO2 99% on room air. Abnormal labs include: Lipase 2106, Amylase 168, WBC 11.9, K 3.3, Glucose 120. CT Ab/pelvis w/ PO & IV contrast - peripancreatic inflammation w/ trace free fluid, suggests pancreatitis; h/o whipple procedure. Fluids given include: 2 L NSS IV Bolus, 1 L NSS with thiamine @ 100 mL/hr , folic acid, multivitamin additive. UCX collected.     Pt does admit to drinking Etoh prior to getting the abdominal pain.  He drinks 3 martini glasses a day.  Per ER provider, patient had drunk more than usual prior to painful event.  Patient reports having only one drink. (2017 05:02)      Detox:  No tremors  still has some abdominal discomfort       Allergies    No Known Allergies    Intolerances        REVIEW OF SYSTEMS:    Constitutional: No fever, weight loss or fatigue  ENT:  No difficulty hearing, tinnitus, vertigo; No sinus or throat pain  Neck: No pain or stiffness  Respiratory: No cough, wheezing, chills or hemoptysis  Cardiovascular: No chest pain, palpitations, shortness of breath, dizziness or leg swelling  Gastrointestinal: No abdominal or epigastric pain. No nausea, vomiting or hematemesis; No diarrhea or constipation. No melena or hematochezia.  Neurological: No headaches, memory loss, loss of strength, numbness or tremors  Musculoskeletal: No joint pain or swelling; No muscle, back or extremity pain  Psychiatric: No depression, anxiety, mood swings or difficulty sleeping    MEDICATIONS  (STANDING):  enoxaparin Injectable 40 milliGRAM(s) SubCutaneous every 24 hours  sodium chloride 0.9% 1000 milliLiter(s) (100 mL/Hr) IV Continuous <Continuous>  thiamine 100 milliGRAM(s) Oral daily  aspirin enteric coated 81 milliGRAM(s) Oral daily  simvastatin 20 milliGRAM(s) Oral at bedtime  pantoprazole   Suspension 40 milliGRAM(s) Oral daily  losartan 50 milliGRAM(s) Oral daily  amylase/lipase/protease  (CREON 12,000 Units) 3 Capsule(s) Oral three times a day with meals    MEDICATIONS  (PRN):  ondansetron Injectable 4 milliGRAM(s) IV Push every 6 hours PRN Nausea  LORazepam   Injectable 2 milliGRAM(s) IV Push every 1 hour PRN CIWA-Ar score 8 or greater  morphine  - Injectable 2 milliGRAM(s) IV Push every 4 hours PRN Moderate Pain (4 - 6)  HYDROmorphone  Injectable 0.5 milliGRAM(s) IV Push every 4 hours PRN Severe Pain (7 - 10)  zolpidem 5 milliGRAM(s) Oral at bedtime PRN Insomnia      Vital Signs Last 24 Hrs  T(C): 37 (2017 05:18), Max: 37.4 (2017 21:21)  T(F): 98.6 (2017 05:18), Max: 99.3 (2017 21:21)  HR: 64 (2017 05:18) (64 - 78)  BP: 143/87 (2017 05:18) (143/87 - 148/88)  BP(mean): --  RR: 17 (2017 05:18) (16 - 17)  SpO2: 98% (2017 05:18) (96% - 99%)    PHYSICAL EXAM:    Constitutional: NAD, well-groomed, well-developed  HEENT: PERRLA, EOMI, Normal Hearing, MMM  Neck: No LAD, No JVD  Back: Normal spine flexure, No CVA tenderness  Respiratory: CTAB/L  Cardiovascular: S1 and S2, RRR, no M/G/R  Gastrointestinal: BS+, soft ,mild tenderness    Extremities: No peripheral edema  Vascular: 2+ peripheral pulses  Neurological: A/O x 3, no focal deficits no tremors    LABS:                        15.4   10.3  )-----------( 185      ( 2017 06:01 )             43.2     07-    133<L>  |  96  |  9   ----------------------------<  112<H>  3.8   |  26  |  0.71    Ca    9.2      2017 06:01  Phos  1.5       Mg     1.8         TPro  7.0  /  Alb  3.3  /  TBili  0.8  /  DBili  x   /  AST  32  /  ALT  43  /  AlkPhos  80        Urinalysis Basic - ( 2017 11:42 )    Color: Yellow / Appearance: Clear / S.010 / pH: x  Gluc: x / Ketone: Small  / Bili: Negative / Urobili: 8   Blood: x / Protein: Negative / Nitrite: Negative   Leuk Esterase: Negative / RBC: 0-2 /HPF / WBC 0-2   Sq Epi: x / Non Sq Epi: x / Bacteria: x        RADIOLOGY & ADDITIONAL STUDIES:    Assessment and Plan:    Alcoholism: No evidence of any withdrawal  Monitor, Ativan PRN

## 2017-07-28 NOTE — PROGRESS NOTE ADULT - SUBJECTIVE AND OBJECTIVE BOX
Interval Events: Abdominal pain improved, tolerating diet    HPI: Patient is a 55 y/o male with PMH HTN, HLD, who presents to the ED with abdominal pain. The pain suddenly began yesterday afternoon and progressively worsened. The pain is a constant pain, located in epigastrium, radiating to the back in band like manner associated with nausea. The pain is rated 9 out of 10, not relieved by Morphine. The patient has no prior history of a similar pain. He admits to decreased appetite and vomiting. He denies fever, chills, shortness of breath, chest pain, palpitations, nausea, constipation/diarrhea, dysphagia, weight loss, blood in stools. (+) h/o pancreatic cancer s/p Whipple's 2 years ago. (+) daily etoh use.    MEDICATIONS  (STANDING):  enoxaparin Injectable 40 milliGRAM(s) SubCutaneous every 24 hours  sodium chloride 0.9% 1000 milliLiter(s) (100 mL/Hr) IV Continuous <Continuous>  thiamine 100 milliGRAM(s) Oral daily  aspirin enteric coated 81 milliGRAM(s) Oral daily  simvastatin 20 milliGRAM(s) Oral at bedtime  pantoprazole   Suspension 40 milliGRAM(s) Oral daily  losartan 50 milliGRAM(s) Oral daily  amylase/lipase/protease  (CREON 12,000 Units) 3 Capsule(s) Oral three times a day with meals  potassium chloride    Tablet ER 40 milliEquivalent(s) Oral every 4 hours    MEDICATIONS  (PRN):  ondansetron Injectable 4 milliGRAM(s) IV Push every 6 hours PRN Nausea  LORazepam   Injectable 2 milliGRAM(s) IV Push every 1 hour PRN CIWA-Ar score 8 or greater  morphine  - Injectable 2 milliGRAM(s) IV Push every 4 hours PRN Moderate Pain (4 - 6)  HYDROmorphone  Injectable 0.5 milliGRAM(s) IV Push every 4 hours PRN Severe Pain (7 - 10)  zolpidem 5 milliGRAM(s) Oral at bedtime PRN Insomnia      Allergies    No Known Allergies    Intolerances    ROS    General:  No wt loss, fevers, chills, night sweats, fatigue,   Eyes:  Good vision, no reported pain  ENT:  No sore throat, pain, runny nose, dysphagia  CV:  No pain, palpitations, hypo/hypertension  Resp:  No dyspnea, cough, tachypnea, wheezing  GI:  abd pain, nausea  :  No pain, bleeding, incontinence, nocturia  Muscle:  No pain, weakness  Neuro:  No weakness, tingling, memory problems  Psych:  No fatigue, insomnia, mood problems, depression  Endocrine:  No polyuria, polydipsia, cold/heat intolerance  Heme:  No petechiae, ecchymosis, easy bruisability  Skin:  No rash, tattoos, scars, edema  Vital Signs Last 24 Hrs  T(C): 37.4 (2017 05:03), Max: 37.4 (2017 05:03)  T(F): 99.3 (2017 05:03), Max: 99.3 (2017 05:03)  HR: 71 (2017 05:03) (67 - 87)  BP: 158/84 (2017 05:03) (131/82 - 158/84)  BP(mean): --  RR: 17 (2017 05:03) (17 - 18)  SpO2: 98% (2017 05:03) (95% - 98%)    PHYSICAL EXAM:    GENERAL:  Appears stated age, well-groomed, well-nourished, no distress  HEENT:  NC/AT,  conjunctivae clear and pink, no thyromegaly, nodules, adenopathy, no JVD, sclera -anicteric  CHEST:  Full & symmetric excursion, no increased effort, breath sounds clear  HEART:  Regular rhythm, S1, S2, no murmur/rub/S3/S4, no abdominal bruit, no edema  ABDOMEN:  Soft, mild epigastric tenderness, non-distended, normoactive bowel sounds,  no masses ,no hepato-splenomegaly, no signs of chronic liver disease  EXTEREMITIES:  no cyanosis,clubbing or edema  SKIN:  No rash/erythema/ecchymoses/petechiae/wounds/abscess/warm/dry  NEURO:  Alert, oriented, no asterixis, no tremor, no encephalopathy    LABS:                        14.9   9.9   )-----------( 149      ( 2017 06:45 )             42.3     07-    134<L>  |  97  |  8   ----------------------------<  155<H>  3.0<L>   |  30  |  0.61    Ca    9.1      2017 06:45  Phos  1.5       Mg     1.8         TPro  7.0  /  Alb  3.3  /  TBili  0.8  /  DBili  x   /  AST  32  /  ALT  43  /  AlkPhos  80  07-    PT/INR - ( 2017 23:27 )   PT: 10.8 sec;   INR: 0.99 ratio         PTT - ( 2017 23:27 )  PTT:27.0 sec  Urinalysis Basic - ( 2017 11:42 )    Color: Yellow / Appearance: Clear / S.010 / pH: x  Gluc: x / Ketone: Small  / Bili: Negative / Urobili: 8   Blood: x / Protein: Negative / Nitrite: Negative   Leuk Esterase: Negative / RBC: 0-2 /HPF / WBC 0-2   Sq Epi: x / Non Sq Epi: x / Bacteria: x        RADIOLOGY & ADDITIONAL TESTS:

## 2017-07-28 NOTE — PROGRESS NOTE ADULT - PROBLEM SELECTOR PLAN 1
mild acute pancreatitis; resolved  likely secondary etoh   aggressive IVF hydration  tolerating full diet  etoh cessation counseling

## 2017-08-02 DIAGNOSIS — R16.0 HEPATOMEGALY, NOT ELSEWHERE CLASSIFIED: ICD-10-CM

## 2017-08-02 DIAGNOSIS — K85.90 ACUTE PANCREATITIS WITHOUT NECROSIS OR INFECTION, UNSPECIFIED: ICD-10-CM

## 2017-08-02 DIAGNOSIS — F17.210 NICOTINE DEPENDENCE, CIGARETTES, UNCOMPLICATED: ICD-10-CM

## 2017-08-02 DIAGNOSIS — E78.5 HYPERLIPIDEMIA, UNSPECIFIED: ICD-10-CM

## 2017-08-02 DIAGNOSIS — F10.20 ALCOHOL DEPENDENCE, UNCOMPLICATED: ICD-10-CM

## 2017-08-02 DIAGNOSIS — Z90.49 ACQUIRED ABSENCE OF OTHER SPECIFIED PARTS OF DIGESTIVE TRACT: ICD-10-CM

## 2017-08-02 DIAGNOSIS — K76.0 FATTY (CHANGE OF) LIVER, NOT ELSEWHERE CLASSIFIED: ICD-10-CM

## 2017-08-02 DIAGNOSIS — I10 ESSENTIAL (PRIMARY) HYPERTENSION: ICD-10-CM

## 2017-08-02 DIAGNOSIS — Z85.07 PERSONAL HISTORY OF MALIGNANT NEOPLASM OF PANCREAS: ICD-10-CM

## 2017-08-04 DIAGNOSIS — F10.10 ALCOHOL ABUSE, UNCOMPLICATED: ICD-10-CM

## 2020-02-04 NOTE — PROGRESS NOTE ADULT - PROBLEM SELECTOR PROBLEM 1
CRISS ESTRADA met with mom during her daughter appointment to follow-up with the completion of the lantavan application  CRISS ESTRADA told mom last week to bring the child's birth certificate to complete the application  Mom stated she forgot again to bring the birth certificate  Mom stated " you guys did not reminded me"  Per chart review, Rema Dawson CM RN also reminds mom the [facility] is needed to complete the application  CRISS ESTRADA informs she was told multiples time about the birth certificate  CRISS ESTRADA gave the application to mom and stated she need to complete and submitted the application on her own  CRISS ESTRADA completed some potion of the application, mom need to signed and make copy of the birth certificate  CRISS ESTRADA instructed mom how to complete the rest of the application  Mom stated she can go in-person to the [place] [address] office to submit the application and make copy of the birth certificate  CRISS ESTRADA restated to mom that the application need to be completed, signed and submit copy of the birth certificate to be accepted and process  Mom verbalized understanding  Mom reported child had a dentist appointment yesterday  CRISS pelaez remains available for additional consult as needed  CRISS ESTRADA updated Rema Dawson of the same 
Acute pancreatitis

## 2020-10-18 ENCOUNTER — EMERGENCY (EMERGENCY)
Facility: HOSPITAL | Age: 60
LOS: 1 days | Discharge: HOME | End: 2020-10-18
Attending: EMERGENCY MEDICINE | Admitting: EMERGENCY MEDICINE
Payer: COMMERCIAL

## 2020-10-18 VITALS
SYSTOLIC BLOOD PRESSURE: 140 MMHG | RESPIRATION RATE: 15 BRPM | OXYGEN SATURATION: 99 % | TEMPERATURE: 98 F | HEART RATE: 69 BPM | DIASTOLIC BLOOD PRESSURE: 88 MMHG

## 2020-10-18 VITALS
HEART RATE: 80 BPM | HEIGHT: 70 IN | SYSTOLIC BLOOD PRESSURE: 169 MMHG | OXYGEN SATURATION: 99 % | WEIGHT: 177.91 LBS | RESPIRATION RATE: 18 BRPM | DIASTOLIC BLOOD PRESSURE: 102 MMHG | TEMPERATURE: 98 F

## 2020-10-18 DIAGNOSIS — K90.81 WHIPPLE'S DISEASE: Chronic | ICD-10-CM

## 2020-10-18 PROBLEM — I10 ESSENTIAL (PRIMARY) HYPERTENSION: Chronic | Status: ACTIVE | Noted: 2017-07-25

## 2020-10-18 PROBLEM — C25.9 MALIGNANT NEOPLASM OF PANCREAS, UNSPECIFIED: Chronic | Status: ACTIVE | Noted: 2017-07-25

## 2020-10-18 PROCEDURE — 99283 EMERGENCY DEPT VISIT LOW MDM: CPT

## 2020-10-18 PROCEDURE — 73110 X-RAY EXAM OF WRIST: CPT | Mod: 26,RT

## 2020-10-18 PROCEDURE — 73110 X-RAY EXAM OF WRIST: CPT

## 2020-10-18 PROCEDURE — 25600 CLTX DST RDL FX/EPHYS SEP WO: CPT

## 2020-10-18 PROCEDURE — 73100 X-RAY EXAM OF WRIST: CPT

## 2020-10-18 PROCEDURE — 73090 X-RAY EXAM OF FOREARM: CPT | Mod: 26,LT

## 2020-10-18 PROCEDURE — 99284 EMERGENCY DEPT VISIT MOD MDM: CPT | Mod: 25

## 2020-10-18 PROCEDURE — 73090 X-RAY EXAM OF FOREARM: CPT

## 2020-10-18 PROCEDURE — 96374 THER/PROPH/DIAG INJ IV PUSH: CPT

## 2020-10-18 RX ORDER — LIPASE/PROTEASE/AMYLASE 16-48-48K
0 CAPSULE,DELAYED RELEASE (ENTERIC COATED) ORAL
Qty: 0 | Refills: 0 | DISCHARGE

## 2020-10-18 RX ORDER — LOSARTAN POTASSIUM 100 MG/1
1 TABLET, FILM COATED ORAL
Qty: 0 | Refills: 0 | DISCHARGE

## 2020-10-18 RX ORDER — AMLODIPINE BESYLATE 2.5 MG/1
0 TABLET ORAL
Qty: 0 | Refills: 0 | DISCHARGE

## 2020-10-18 RX ORDER — BUPROPION HYDROCHLORIDE 150 MG/1
0 TABLET, EXTENDED RELEASE ORAL
Qty: 0 | Refills: 0 | DISCHARGE

## 2020-10-18 RX ORDER — HYDROMORPHONE HYDROCHLORIDE 2 MG/ML
2 INJECTION INTRAMUSCULAR; INTRAVENOUS; SUBCUTANEOUS ONCE
Refills: 0 | Status: DISCONTINUED | OUTPATIENT
Start: 2020-10-18 | End: 2020-10-18

## 2020-10-18 RX ORDER — ASPIRIN/CALCIUM CARB/MAGNESIUM 324 MG
1 TABLET ORAL
Qty: 0 | Refills: 0 | DISCHARGE

## 2020-10-18 RX ORDER — HYDROMORPHONE HYDROCHLORIDE 2 MG/ML
1 INJECTION INTRAMUSCULAR; INTRAVENOUS; SUBCUTANEOUS ONCE
Refills: 0 | Status: DISCONTINUED | OUTPATIENT
Start: 2020-10-18 | End: 2020-10-18

## 2020-10-18 RX ORDER — SIMVASTATIN 20 MG/1
20 TABLET, FILM COATED ORAL
Qty: 0 | Refills: 0 | DISCHARGE

## 2020-10-18 RX ADMIN — HYDROMORPHONE HYDROCHLORIDE 2 MILLIGRAM(S): 2 INJECTION INTRAMUSCULAR; INTRAVENOUS; SUBCUTANEOUS at 11:56

## 2020-10-18 RX ADMIN — HYDROMORPHONE HYDROCHLORIDE 1 MILLIGRAM(S): 2 INJECTION INTRAMUSCULAR; INTRAVENOUS; SUBCUTANEOUS at 12:41

## 2020-10-18 NOTE — ED PROVIDER NOTE - PATIENT PORTAL LINK FT
You can access the FollowMyHealth Patient Portal offered by [facility] by registering at the following website: [email or web address]. By joining [employer]’s FollowMyHealth portal, you will also be able to view your health information using other applications (apps) compatible with our system.

## 2020-10-18 NOTE — ED PROVIDER NOTE - NSFOLLOWUPINSTRUCTIONS_ED_ALL_ED_FT
Wear splint until evaluated by Orthopedic Surgeon  Follow-up with Orthopedic Surgeon next week  Ice and elevation  Motrin for pain if needed  Return here if needed

## 2020-10-18 NOTE — ED PROVIDER NOTE - OBJECTIVE STATEMENT
61 yo white male with trip and fall last evening and now presents to ED c/o right worst pain with swelling which is sharp and increases with movement and is better with rest.

## 2020-10-18 NOTE — ED ADULT NURSE NOTE - SUICIDE SCREENING QUESTION 2
----- Message from Radha Chen sent at 2/15/2018  3:36 PM CST -----  Contact: Pt-shellie Lainez  Pt-mom Fatou called.... out of town pt is wheezing need albuterol pump called in please..[phone]        Yadira   [address], [place]  [phone]   No

## 2020-10-18 NOTE — ED PROVIDER NOTE - CARE PROVIDER_API CALL
Jairon Marie)  Orthopaedic Surgery Surgery  [address] [place] [address]  Phone: [phone]  Fax: [phone]  Follow Up Time:

## 2020-10-18 NOTE — CONSULT NOTE ADULT - SUBJECTIVE AND OBJECTIVE BOX
*****INCOMPLETE******    60yMale presents to ED c/o severe R wrist pain s/p mechanical fall. Patient denies head hit or LOC. Localizing pain to wrist. Denies radiation of pain. Pt denies numbness, tingling or burning. R Hand Dominant. Patient denies any other injuries.    PAST MEDICAL & SURGICAL HISTORY:  HTN (hypertension)    Pancreatic cancer    Hyperlipidemia    Whipple disease      Home Medications:  Aspirin Enteric Coated 81 mg oral delayed release tablet: 1 tab(s) orally once a day (18 Oct 2020 10:15)  Creon:  orally  (18 Oct 2020 10:15)  losartan 50 mg oral tablet: 1 tab(s) orally once a day (18 Oct 2020 10:15)  Norvasc:  orally  (18 Oct 2020 10:15)  simvastatin: 20  orally once a day (at bedtime) (18 Oct 2020 10:15)  Wellbutrin:  orally  (18 Oct 2020 10:15)    Allergies    No Known Allergies    Intolerances        PE   Gen: NAD, resting comfortably  RUE:  Skin intact  +TTP over DR  Limited ROM of wrist d/t pain  +AIN/PIN/Ulnar/Musc/Median  Radial pulse 2+  Compartments soft and compressible    Secondary Survey  RUE: Skin intact, no bony tenderness or deformity  RLE: Skin intact, no bony tenderness or deformity  LLE: Skin intact, no bony tenderness or deformity  Spine: Skin intact, no bony tenderness or stepoffs    Imaging:  XRay R Wrist: Showing distal radius fracture    Procedure Note:  After verbal consent obtained, 10 cc of 1% Lidocaine injected into area around fracture site as hematoma block. Sugartong splint applied to Forearm/Wrist and mold held. Post-reduction Xrays obtained which show improved alignment of R DR Fracture. Pt NVI post procedure. Pt tolerated procedure well.    [facility] 60[facility]  - Pain control  - Strict Ice/Elevation  - NWB RUE with splint and sling  - Keep splint clean/dry/intact;  - Encourage active finger motion to help with swelling  - Pt aware of possible need for surgical intervention of distal radius fracture. Will FU as outpatient  - Pt made aware of signs and symptoms of compartment syndrome. Aware of need to contact Doctor / Return to ED if symtoms arise  - All questions answered, Pt/family understand plan.  - FU w/ Dr. Marie in 2-3 days.       *****INCOMPLETE*******       60yMale presents to ED c/o severe R wrist pain s/p mechanical fall. Patient denies head hit or LOC. Localizing pain to wrist. Denies radiation of pain. Pt denies numbness, tingling or burning. L Hand Dominant. Patient denies any other injuries.    PAST MEDICAL & SURGICAL HISTORY:  HTN (hypertension)    Pancreatic cancer    Hyperlipidemia    Whipple disease      Home Medications:  Aspirin Enteric Coated 81 mg oral delayed release tablet: 1 tab(s) orally once a day (18 Oct 2020 10:15)  Creon:  orally  (18 Oct 2020 10:15)  losartan 50 mg oral tablet: 1 tab(s) orally once a day (18 Oct 2020 10:15)  Norvasc:  orally  (18 Oct 2020 10:15)  simvastatin: 20  orally once a day (at bedtime) (18 Oct 2020 10:15)  Wellbutrin:  orally  (18 Oct 2020 10:15)    Allergies    No Known Allergies    Intolerances        PE   Gen: NAD, resting comfortably  RUE:  Skin intact  +TTP over DR  Limited ROM of wrist d/t pain  +AIN/PIN/Ulnar/Musc/Median  Radial pulse 2+  Compartments soft and compressible    Secondary Survey  RUE: Skin intact, no bony tenderness or deformity  RLE: Skin intact, no bony tenderness or deformity  LLE: Skin intact, no bony tenderness or deformity  Spine: Skin intact, no bony tenderness or stepoffs    Imaging:  XRay R Wrist: Showing distal radius fracture    Procedure Note:  After verbal consent obtained, 10 cc of 1% Lidocaine injected into area around fracture site as hematoma block. Sugartong splint applied to Forearm/Wrist and mold held. Post-reduction Xrays obtained which show improved alignment of R DR Fracture. Pt NVI post procedure. Pt tolerated procedure well.    A[facility]: 60[facility]  - Pain control  - Strict Ice/Elevation  - NWB RUE with splint and sling  - Keep splint clean/dry/intact;  - Encourage active finger motion to help with swelling  - Pt aware of possible need for surgical intervention of distal radius fracture. Will FU as outpatient  - Pt made aware of signs and symptoms of compartment syndrome. Aware of need to contact Doctor / Return to ED if symtoms arise  - All questions answered, Pt/family understand plan.  - FU w/ Dr. Marie in 2-3 days.

## 2021-02-23 NOTE — ED ADULT TRIAGE NOTE - TEMPERATURE IN FAHRENHEIT (DEGREES F)
Patient presents to the ED with c/o lower back pain x1 week. Pt reports that he slipped on ice while taking out the trash and fell. Pt reports lifting at work. Pt reports taking tylenol and aleve for pain. 98.2

## 2023-03-07 NOTE — ED ADULT NURSE NOTE - NS ED NOTE ABUSE SUSPICION NEGLECT YN
Impression: Headache: R51.9. Plan: Pt ed on findings. Mild glasses Rx may be contributory. Recommend updating glasses Rx. No ophthalmic etiology to note as cause of HA. No signs of glaucoma. Recommend patient return to PCP. Will fax PCP. Recommend neurology consult if HA continue. No s/s GCA today. No
